# Patient Record
Sex: FEMALE | Race: WHITE | NOT HISPANIC OR LATINO | URBAN - METROPOLITAN AREA
[De-identification: names, ages, dates, MRNs, and addresses within clinical notes are randomized per-mention and may not be internally consistent; named-entity substitution may affect disease eponyms.]

---

## 2020-09-24 ENCOUNTER — OFFICE VISIT (OUTPATIENT)
Dept: URBAN - METROPOLITAN AREA CLINIC 23 | Facility: CLINIC | Age: 70
End: 2020-09-24
Payer: COMMERCIAL

## 2020-09-24 VITALS
BODY MASS INDEX: 35.3 KG/M2 | DIASTOLIC BLOOD PRESSURE: 70 MMHG | SYSTOLIC BLOOD PRESSURE: 133 MMHG | TEMPERATURE: 98 F | HEIGHT: 63 IN | HEART RATE: 79 BPM | WEIGHT: 199.2 LBS

## 2020-09-24 DIAGNOSIS — R14.3 GAS: ICD-10-CM

## 2020-09-24 DIAGNOSIS — R14.0 BLOATING: ICD-10-CM

## 2020-09-24 DIAGNOSIS — K59.01 CONSTIPATION: ICD-10-CM

## 2020-09-24 DIAGNOSIS — Z12.11 SCREEN FOR COLON CANCER: ICD-10-CM

## 2020-09-24 DIAGNOSIS — K58.9 IRRITABLE BOWEL SYNDROME: ICD-10-CM

## 2020-09-24 DIAGNOSIS — R19.7 DIARRHEA: ICD-10-CM

## 2020-09-24 PROCEDURE — 3017F COLORECTAL CA SCREEN DOC REV: CPT | Performed by: INTERNAL MEDICINE

## 2020-09-24 PROCEDURE — G9903 PT SCRN TBCO ID AS NON USER: HCPCS | Performed by: INTERNAL MEDICINE

## 2020-09-24 PROCEDURE — G8427 DOCREV CUR MEDS BY ELIG CLIN: HCPCS | Performed by: INTERNAL MEDICINE

## 2020-09-24 PROCEDURE — 91065 BREATH HYDROGEN/METHANE TEST: CPT | Performed by: INTERNAL MEDICINE

## 2020-09-24 PROCEDURE — 99203 OFFICE O/P NEW LOW 30 MIN: CPT | Performed by: INTERNAL MEDICINE

## 2020-09-24 NOTE — HPI-TODAY'S VISIT:
69 yo female with history of IBS and GERD -she moved [place] to be closer to her daugther -she states that for the past 7-10 years she has seen the GI for reflu -had endoscopy in 2013 and had esopahgitis.  is on omeprazole for this -colonoscopy at that time was normal , colonoscopy repeat in 2023 -states that initially her IBS was more constipation, was on mirlaax daily.  in 2016 she started alternating between diarrhea and constipation.  this has continued since that time -she feels that the sx have flared more since she has moved 5 months ago -she often gets frequent loose stools which keep her from going far from a bathroom -at her last gi appointment she was told to switch from miralax to metamucil.  when she took the metamucil, she feels that this doesn't help the frequent stool -usually the loose stools happen if she is nervous, stressed - her  has significant health issues -she is also being treated for anxiety and panic disorder and is on meds for depression- she started effexor just now , ativan is used as needed -states that the loose frequent stools lasts for 6 days  -she has hyoscyamine for bloating and gas, but it doesn't help the loose stool -she takes immodium to help with the loose stool, usually takes 2 days of immodium -no blood in the stool -she had a cologuard test with pcp this year told it was negative -she has a lot of bloating and gas -she has noticed the change in her bowel mvoements for years -she takes culturelle daily- us tstarted taking it consistantly  -has gained weight , she feels that the symptoms are worse with this -she had gotten the handout

## 2020-11-02 ENCOUNTER — OFFICE VISIT (OUTPATIENT)
Dept: URBAN - METROPOLITAN AREA CLINIC 23 | Facility: CLINIC | Age: 70
End: 2020-11-02
Payer: COMMERCIAL

## 2020-11-02 ENCOUNTER — WEB ENCOUNTER (OUTPATIENT)
Dept: URBAN - METROPOLITAN AREA CLINIC 23 | Facility: CLINIC | Age: 70
End: 2020-11-02

## 2020-11-02 VITALS
HEIGHT: 63 IN | TEMPERATURE: 98.1 F | DIASTOLIC BLOOD PRESSURE: 83 MMHG | SYSTOLIC BLOOD PRESSURE: 134 MMHG | BODY MASS INDEX: 35.44 KG/M2 | WEIGHT: 200 LBS | HEART RATE: 93 BPM

## 2020-11-02 DIAGNOSIS — R19.7 DIARRHEA: ICD-10-CM

## 2020-11-02 DIAGNOSIS — Z12.11 SCREEN FOR COLON CANCER: ICD-10-CM

## 2020-11-02 DIAGNOSIS — R14.0 BLOATING: ICD-10-CM

## 2020-11-02 DIAGNOSIS — K58.9 IRRITABLE BOWEL SYNDROME: ICD-10-CM

## 2020-11-02 DIAGNOSIS — K59.01 CONSTIPATION: ICD-10-CM

## 2020-11-02 DIAGNOSIS — R14.3 GAS: ICD-10-CM

## 2020-11-02 PROCEDURE — 99213 OFFICE O/P EST LOW 20 MIN: CPT | Performed by: INTERNAL MEDICINE

## 2020-11-02 PROCEDURE — 3017F COLORECTAL CA SCREEN DOC REV: CPT | Performed by: INTERNAL MEDICINE

## 2020-11-02 PROCEDURE — G8420 CALC BMI NORM PARAMETERS: HCPCS | Performed by: INTERNAL MEDICINE

## 2020-11-02 PROCEDURE — G9903 PT SCRN TBCO ID AS NON USER: HCPCS | Performed by: INTERNAL MEDICINE

## 2020-11-02 RX ORDER — ATORVASTATIN CALCIUM 20 MG/1
1 TABLET TABLET, FILM COATED ORAL ONCE A DAY
Status: ACTIVE | COMMUNITY

## 2020-11-02 RX ORDER — OMEPRAZOLE 40 MG/1
1 CAPSULE 30 MINUTES BEFORE MORNING MEAL CAPSULE, DELAYED RELEASE ORAL ONCE A DAY
Status: ACTIVE | COMMUNITY

## 2020-11-02 NOTE — HPI-TODAY'S VISIT:
- she was on abx for uri so didn't do the sibo test -she states that she started taking the metamucil daily, didn't help with the constipation -she states that she took miralax as well for the constipation she got while she was on the z pack, she states that she only had one bout of loose stool since she was seen -she feels that the diarrhea is better but the constipation  -she doesn't feel like she is emptying out well.  she sometimes has straining -she states that she is trying the fodmap diet  -she states that her last colonoscopy was done in 2013 because of bleeding, she was definitely having these symptoms at that time she states clearly.

## 2020-11-05 ENCOUNTER — OFFICE VISIT (OUTPATIENT)
Dept: URBAN - METROPOLITAN AREA CLINIC 23 | Facility: CLINIC | Age: 70
End: 2020-11-05

## 2020-11-20 ENCOUNTER — OFFICE VISIT (OUTPATIENT)
Dept: URBAN - METROPOLITAN AREA CLINIC 22 | Facility: CLINIC | Age: 70
End: 2020-11-20
Payer: COMMERCIAL

## 2020-11-20 DIAGNOSIS — R19.7 ACUTE DIARRHEA: ICD-10-CM

## 2020-11-20 PROCEDURE — 91065 BREATH HYDROGEN/METHANE TEST: CPT | Performed by: INTERNAL MEDICINE

## 2020-12-04 ENCOUNTER — TELEPHONE ENCOUNTER (OUTPATIENT)
Dept: URBAN - METROPOLITAN AREA CLINIC 23 | Facility: CLINIC | Age: 70
End: 2020-12-04

## 2020-12-04 RX ORDER — RIFAXIMIN 550 MG/1
1 TABLET TABLET ORAL THREE TIMES A DAY
Qty: 42 TABLET | Refills: 0 | OUTPATIENT
Start: 2020-12-07 | End: 2020-12-21

## 2020-12-10 ENCOUNTER — TELEPHONE ENCOUNTER (OUTPATIENT)
Dept: URBAN - METROPOLITAN AREA CLINIC 23 | Facility: CLINIC | Age: 70
End: 2020-12-10

## 2020-12-30 ENCOUNTER — OFFICE VISIT (OUTPATIENT)
Dept: URBAN - METROPOLITAN AREA TELEHEALTH 2 | Facility: TELEHEALTH | Age: 70
End: 2020-12-30
Payer: COMMERCIAL

## 2020-12-30 DIAGNOSIS — K58.0 IRRITABLE BOWEL SYNDROME WITH DIARRHEA: ICD-10-CM

## 2020-12-30 PROCEDURE — 97802 MEDICAL NUTRITION INDIV IN: CPT | Performed by: DIETITIAN, REGISTERED

## 2020-12-30 RX ORDER — ATORVASTATIN CALCIUM 20 MG/1
1 TABLET TABLET, FILM COATED ORAL ONCE A DAY
Status: ACTIVE | COMMUNITY

## 2020-12-30 RX ORDER — OMEPRAZOLE 40 MG/1
1 CAPSULE 30 MINUTES BEFORE MORNING MEAL CAPSULE, DELAYED RELEASE ORAL ONCE A DAY
Status: ACTIVE | COMMUNITY

## 2021-01-07 ENCOUNTER — OFFICE VISIT (OUTPATIENT)
Dept: URBAN - METROPOLITAN AREA CLINIC 23 | Facility: CLINIC | Age: 71
End: 2021-01-07
Payer: COMMERCIAL

## 2021-01-07 VITALS
WEIGHT: 197 LBS | HEART RATE: 96 BPM | HEIGHT: 63 IN | DIASTOLIC BLOOD PRESSURE: 85 MMHG | TEMPERATURE: 97.7 F | BODY MASS INDEX: 34.91 KG/M2 | SYSTOLIC BLOOD PRESSURE: 148 MMHG

## 2021-01-07 DIAGNOSIS — K21.9 GERD: ICD-10-CM

## 2021-01-07 DIAGNOSIS — Z12.11 SCREEN FOR COLON CANCER: ICD-10-CM

## 2021-01-07 DIAGNOSIS — R14.3 GAS: ICD-10-CM

## 2021-01-07 DIAGNOSIS — K58.2 IRRITABLE BOWEL SYNDROME WITH BOTH CONSTIPATION AND DIARRHEA: ICD-10-CM

## 2021-01-07 PROCEDURE — G9903 PT SCRN TBCO ID AS NON USER: HCPCS | Performed by: INTERNAL MEDICINE

## 2021-01-07 PROCEDURE — G8420 CALC BMI NORM PARAMETERS: HCPCS | Performed by: INTERNAL MEDICINE

## 2021-01-07 PROCEDURE — 99214 OFFICE O/P EST MOD 30 MIN: CPT | Performed by: INTERNAL MEDICINE

## 2021-01-07 PROCEDURE — G8430 EC AT DOC MEDREC PT NOT ELIG: HCPCS | Performed by: INTERNAL MEDICINE

## 2021-01-07 PROCEDURE — G8482 FLU IMMUNIZE ORDER/ADMIN: HCPCS | Performed by: INTERNAL MEDICINE

## 2021-01-07 PROCEDURE — 3017F COLORECTAL CA SCREEN DOC REV: CPT | Performed by: INTERNAL MEDICINE

## 2021-01-07 PROCEDURE — 1036F TOBACCO NON-USER: CPT | Performed by: INTERNAL MEDICINE

## 2021-01-07 RX ORDER — ATORVASTATIN CALCIUM 20 MG/1
1 TABLET TABLET, FILM COATED ORAL ONCE A DAY
Status: ACTIVE | COMMUNITY

## 2021-01-07 RX ORDER — OMEPRAZOLE 40 MG/1
1 CAPSULE 30 MINUTES BEFORE MORNING MEAL CAPSULE, DELAYED RELEASE ORAL ONCE A DAY
Status: ACTIVE | COMMUNITY

## 2021-01-07 RX ORDER — LINACLOTIDE 145 UG/1
1 CAPSULE AT LEAST 30 MINUTES BEFORE THE FIRST MEAL OF THE DAY ON AN EMPTY STOMACH CAPSULE, GELATIN COATED ORAL ONCE A DAY
Qty: 90 | Refills: 3 | OUTPATIENT
Start: 2021-01-07

## 2021-01-07 NOTE — HPI-TODAY'S VISIT:
- she was on abx for uri so didn't do the sibo test -she states that she started taking the metamucil daily, didn't help with the constipation -she states that she took miralax as well for the constipation she got while she was on the z pack, she states that she only had one bout of loose stool since she was seen -she feels that the diarrhea is better but the constipation  -she doesn't feel like she is emptying out well.  she sometimes has straining -she states that she is trying the fodmap diet  -she states that her last colonoscopy was done in 2013 because of bleeding, she was definitely having these symptoms at that time she states clearly. ------------------------------------------------------------------------------------------------------------------- 1/7/21 she states that she took breath test , was positive and took xifaxin 14 day course -still has symptoms. felt marginally better -she feels like she always has gas and bloating. she states that she wanted to know if she could use gas x and beano  -when she has constipation she doesn't feel that miralax does help.

## 2021-01-07 NOTE — HPI-OTHER HISTORIES
71 yo female with history of IBS and GERD -she [place] to be closer to her daugther -she states that for the past 7-10 years she has seen the GI for reflu -had endoscopy in 2013 and had esopahgitis.  is on omeprazole for this -colonoscopy at that time was normal , colonoscopy repeat in 2023 -states that initially her IBS was more constipation, was on mirlaax daily.  in 2016 she started alternating between diarrhea and constipation.  this has continued since that time -she feels that the sx have flared more since she has moved 5 months ago -she often gets frequent loose stools which keep her from going far from a bathroom -at her last gi appointment she was told to switch from miralax to metamucil.  when she took the metamucil, she feels that this doesn't help the frequent stool -usually the loose stools happen if she is nervous, stressed - her  has significant health issues -she is also being treated for anxiety and panic disorder and is on meds for depression- she started effexor just now , ativan is used as needed -states that the loose frequent stools lasts for 6 days  -she has hyoscyamine for bloating and gas, but it doesn't help the loose stool -she takes immodium to help with the loose stool, usually takes 2 days of immodium -no blood in the stool -she had a cologuard test with pcp this year told it was negative -she has a lot of bloating and gas -she has noticed the change in her bowel mvoements for years -she takes culturelle daily- us tstarted taking it consistantly  -has gained weight , she feels that the symptoms are worse with this -she had gotten the handout

## 2021-01-09 LAB
DEAMIDATED GLIADIN ABS, IGA: 3
DEAMIDATED GLIADIN ABS, IGG: 2
ENDOMYSIAL ANTIBODY IGA: NEGATIVE
IMMUNOGLOBULIN A, QN, SERUM: 226
T-TRANSGLUTAMINASE (TTG) IGA: <2
T-TRANSGLUTAMINASE (TTG) IGG: <2

## 2021-01-19 ENCOUNTER — TELEPHONE ENCOUNTER (OUTPATIENT)
Dept: URBAN - METROPOLITAN AREA CLINIC 92 | Facility: CLINIC | Age: 71
End: 2021-01-19

## 2021-02-03 ENCOUNTER — OFFICE VISIT (OUTPATIENT)
Dept: URBAN - METROPOLITAN AREA TELEHEALTH 2 | Facility: TELEHEALTH | Age: 71
End: 2021-02-03
Payer: COMMERCIAL

## 2021-02-03 DIAGNOSIS — K58.9 IBS (IRRITABLE BOWEL SYNDROME): ICD-10-CM

## 2021-02-03 PROCEDURE — 97803 MED NUTRITION INDIV SUBSEQ: CPT | Performed by: DIETITIAN, REGISTERED

## 2021-02-03 RX ORDER — OMEPRAZOLE 40 MG/1
1 CAPSULE 30 MINUTES BEFORE MORNING MEAL CAPSULE, DELAYED RELEASE ORAL ONCE A DAY
Status: ACTIVE | COMMUNITY

## 2021-02-03 RX ORDER — ATORVASTATIN CALCIUM 20 MG/1
1 TABLET TABLET, FILM COATED ORAL ONCE A DAY
Status: ACTIVE | COMMUNITY

## 2021-02-03 RX ORDER — LINACLOTIDE 145 UG/1
1 CAPSULE AT LEAST 30 MINUTES BEFORE THE FIRST MEAL OF THE DAY ON AN EMPTY STOMACH CAPSULE, GELATIN COATED ORAL ONCE A DAY
Qty: 90 | Refills: 3 | Status: ACTIVE | COMMUNITY
Start: 2021-01-07

## 2021-03-11 ENCOUNTER — OFFICE VISIT (OUTPATIENT)
Dept: URBAN - METROPOLITAN AREA SURGERY CENTER 15 | Facility: SURGERY CENTER | Age: 71
End: 2021-03-11
Payer: COMMERCIAL

## 2021-03-11 ENCOUNTER — CLAIMS CREATED FROM THE CLAIM WINDOW (OUTPATIENT)
Dept: URBAN - METROPOLITAN AREA CLINIC 4 | Facility: CLINIC | Age: 71
End: 2021-03-11
Payer: COMMERCIAL

## 2021-03-11 DIAGNOSIS — K63.89 BACTERIAL OVERGROWTH SYNDROME: ICD-10-CM

## 2021-03-11 DIAGNOSIS — D12.4 BENIGN NEOPLASM OF DESCENDING COLON: ICD-10-CM

## 2021-03-11 DIAGNOSIS — D12.4 ADENOMA OF DESCENDING COLON: ICD-10-CM

## 2021-03-11 DIAGNOSIS — K31.89 ACQUIRED DEFORMITY OF DUODENUM: ICD-10-CM

## 2021-03-11 DIAGNOSIS — K63.89 MASS OF HEPATIC FLEXURE OF COLON: ICD-10-CM

## 2021-03-11 DIAGNOSIS — K31.89 DILATION OF STOMACH: ICD-10-CM

## 2021-03-11 DIAGNOSIS — Z86.010 H/O ADENOMATOUS POLYP OF COLON: ICD-10-CM

## 2021-03-11 DIAGNOSIS — K31.9 DISEASE OF STOMACH AND DUODENUM, UNSPECIFIED: ICD-10-CM

## 2021-03-11 PROCEDURE — 88312 SPECIAL STAINS GROUP 1: CPT | Performed by: PATHOLOGY

## 2021-03-11 PROCEDURE — G8907 PT DOC NO EVENTS ON DISCHARG: HCPCS | Performed by: INTERNAL MEDICINE

## 2021-03-11 PROCEDURE — 88305 TISSUE EXAM BY PATHOLOGIST: CPT | Performed by: PATHOLOGY

## 2021-03-11 PROCEDURE — 43239 EGD BIOPSY SINGLE/MULTIPLE: CPT | Performed by: INTERNAL MEDICINE

## 2021-03-11 PROCEDURE — 45380 COLONOSCOPY AND BIOPSY: CPT | Performed by: INTERNAL MEDICINE

## 2021-03-11 PROCEDURE — 45385 COLONOSCOPY W/LESION REMOVAL: CPT | Performed by: INTERNAL MEDICINE

## 2021-03-11 RX ORDER — ATORVASTATIN CALCIUM 20 MG/1
1 TABLET TABLET, FILM COATED ORAL ONCE A DAY
Status: ACTIVE | COMMUNITY

## 2021-03-11 RX ORDER — LINACLOTIDE 145 UG/1
1 CAPSULE AT LEAST 30 MINUTES BEFORE THE FIRST MEAL OF THE DAY ON AN EMPTY STOMACH CAPSULE, GELATIN COATED ORAL ONCE A DAY
Qty: 90 | Refills: 3 | Status: ACTIVE | COMMUNITY
Start: 2021-01-07

## 2021-03-11 RX ORDER — OMEPRAZOLE 40 MG/1
1 CAPSULE 30 MINUTES BEFORE MORNING MEAL CAPSULE, DELAYED RELEASE ORAL ONCE A DAY
Status: ACTIVE | COMMUNITY

## 2021-03-26 ENCOUNTER — WEB ENCOUNTER (OUTPATIENT)
Dept: URBAN - METROPOLITAN AREA CLINIC 23 | Facility: CLINIC | Age: 71
End: 2021-03-26

## 2021-03-29 ENCOUNTER — WEB ENCOUNTER (OUTPATIENT)
Dept: URBAN - METROPOLITAN AREA CLINIC 23 | Facility: CLINIC | Age: 71
End: 2021-03-29

## 2021-03-30 ENCOUNTER — WEB ENCOUNTER (OUTPATIENT)
Dept: URBAN - METROPOLITAN AREA CLINIC 23 | Facility: CLINIC | Age: 71
End: 2021-03-30

## 2021-03-30 RX ORDER — LINACLOTIDE 145 UG/1
1 CAPSULE AT LEAST 30 MINUTES BEFORE THE FIRST MEAL OF THE DAY ON AN EMPTY STOMACH CAPSULE, GELATIN COATED ORAL ONCE A DAY
Qty: 90 | Refills: 3 | Status: ACTIVE | COMMUNITY
Start: 2021-01-07

## 2021-03-30 RX ORDER — OMEPRAZOLE 40 MG/1
1 CAPSULE 30 MINUTES BEFORE MORNING MEAL CAPSULE, DELAYED RELEASE ORAL ONCE A DAY
Status: ACTIVE | COMMUNITY

## 2021-03-30 RX ORDER — LINACLOTIDE 290 UG/1
1 CAPSULE AT LEAST 30 MINUTES BEFORE THE FIRST MEAL OF THE DAY ON AN EMPTY STOMACH CAPSULE, GELATIN COATED ORAL ONCE A DAY
Qty: 90 | Refills: 0 | OUTPATIENT
Start: 2021-03-31 | End: 2021-06-29

## 2021-03-30 RX ORDER — ATORVASTATIN CALCIUM 20 MG/1
1 TABLET TABLET, FILM COATED ORAL ONCE A DAY
Status: ACTIVE | COMMUNITY

## 2021-05-10 ENCOUNTER — OFFICE VISIT (OUTPATIENT)
Dept: URBAN - METROPOLITAN AREA CLINIC 23 | Facility: CLINIC | Age: 71
End: 2021-05-10
Payer: COMMERCIAL

## 2021-05-10 VITALS
WEIGHT: 198 LBS | TEMPERATURE: 97.5 F | DIASTOLIC BLOOD PRESSURE: 81 MMHG | BODY MASS INDEX: 35.08 KG/M2 | HEART RATE: 80 BPM | SYSTOLIC BLOOD PRESSURE: 150 MMHG | HEIGHT: 63 IN

## 2021-05-10 DIAGNOSIS — K21.9 GERD: ICD-10-CM

## 2021-05-10 DIAGNOSIS — K59.01 CONSTIPATION: ICD-10-CM

## 2021-05-10 DIAGNOSIS — R19.7 DIARRHEA: ICD-10-CM

## 2021-05-10 DIAGNOSIS — R14.3 GAS: ICD-10-CM

## 2021-05-10 DIAGNOSIS — K58.9 IRRITABLE BOWEL SYNDROME: ICD-10-CM

## 2021-05-10 DIAGNOSIS — R14.0 BLOATING: ICD-10-CM

## 2021-05-10 DIAGNOSIS — Z86.010 PERSONAL HISTORY OF COLONIC POLYPS: ICD-10-CM

## 2021-05-10 DIAGNOSIS — K63.89 SMALL INTESTINAL BACTERIAL OVERGROWTH (SIBO): ICD-10-CM

## 2021-05-10 PROBLEM — 275978004 SCREENING FOR MALIGNANT NEOPLASM OF COLON: Status: ACTIVE | Noted: 2020-09-24

## 2021-05-10 PROCEDURE — 99214 OFFICE O/P EST MOD 30 MIN: CPT | Performed by: INTERNAL MEDICINE

## 2021-05-10 RX ORDER — LINACLOTIDE 290 UG/1
1 CAPSULE AT LEAST 30 MINUTES BEFORE THE FIRST MEAL OF THE DAY ON AN EMPTY STOMACH CAPSULE, GELATIN COATED ORAL ONCE A DAY
Qty: 90 | Refills: 0 | Status: ACTIVE | COMMUNITY
Start: 2021-03-31 | End: 2021-06-29

## 2021-05-10 RX ORDER — OMEPRAZOLE 40 MG/1
1 CAPSULE 30 MINUTES BEFORE MORNING MEAL CAPSULE, DELAYED RELEASE ORAL ONCE A DAY
Status: ACTIVE | COMMUNITY

## 2021-05-10 RX ORDER — LINACLOTIDE 145 UG/1
1 CAPSULE AT LEAST 30 MINUTES BEFORE THE FIRST MEAL OF THE DAY ON AN EMPTY STOMACH CAPSULE, GELATIN COATED ORAL ONCE A DAY
Qty: 90 | Refills: 3 | Status: ACTIVE | COMMUNITY
Start: 2021-01-07

## 2021-05-10 RX ORDER — ATORVASTATIN CALCIUM 20 MG/1
1 TABLET TABLET, FILM COATED ORAL ONCE A DAY
Status: ACTIVE | COMMUNITY

## 2021-05-10 RX ORDER — OMEPRAZOLE 40 MG/1
1 CAPSULE 30 MINUTES BEFORE MORNING MEAL CAPSULE, DELAYED RELEASE ORAL ONCE A DAY
Qty: 90 | Refills: 1 | OUTPATIENT
Start: 2021-05-10

## 2021-05-10 RX ORDER — LINACLOTIDE 290 UG/1
1 CAPSULE AT LEAST 30 MINUTES BEFORE THE FIRST MEAL OF THE DAY ON AN EMPTY STOMACH CAPSULE, GELATIN COATED ORAL ONCE A DAY
Qty: 90 | Refills: 0 | OUTPATIENT
Start: 2021-05-10 | End: 2021-08-08

## 2021-05-10 NOTE — HPI-TODAY'S VISIT:
1/7/21 she states that she took breath test , was positive and took xifaxin 14 day course -still has symptoms. felt marginally better -she feels like she always has gas and bloating. she states that she wanted to know if she could use gas x and beano  -when she has constipation she doesn't feel that miralax does help. ====================================================================================== 5/10/2021 egd /colon 3/11- two small colonic polyps, egd normal.  ta on colonoscopy.  she has increase linzess to 290 mcg - she is taking this daily but then can have an intermittent

## 2021-08-23 ENCOUNTER — OFFICE VISIT (OUTPATIENT)
Dept: URBAN - METROPOLITAN AREA CLINIC 23 | Facility: CLINIC | Age: 71
End: 2021-08-23
Payer: COMMERCIAL

## 2021-08-23 ENCOUNTER — WEB ENCOUNTER (OUTPATIENT)
Dept: URBAN - METROPOLITAN AREA CLINIC 23 | Facility: CLINIC | Age: 71
End: 2021-08-23

## 2021-08-23 VITALS
TEMPERATURE: 98 F | HEART RATE: 85 BPM | SYSTOLIC BLOOD PRESSURE: 142 MMHG | BODY MASS INDEX: 33.84 KG/M2 | WEIGHT: 191 LBS | DIASTOLIC BLOOD PRESSURE: 79 MMHG | HEIGHT: 63 IN

## 2021-08-23 DIAGNOSIS — R10.9 ABDOMINAL CRAMPING: ICD-10-CM

## 2021-08-23 DIAGNOSIS — Z86.010 PERSONAL HISTORY OF COLONIC POLYPS: ICD-10-CM

## 2021-08-23 DIAGNOSIS — R19.7 DIARRHEA: ICD-10-CM

## 2021-08-23 DIAGNOSIS — K21.9 GERD: ICD-10-CM

## 2021-08-23 DIAGNOSIS — K59.01 CONSTIPATION: ICD-10-CM

## 2021-08-23 DIAGNOSIS — K58.9 IRRITABLE BOWEL SYNDROME: ICD-10-CM

## 2021-08-23 DIAGNOSIS — K63.89 SMALL INTESTINAL BACTERIAL OVERGROWTH (SIBO): ICD-10-CM

## 2021-08-23 DIAGNOSIS — R14.3 GAS: ICD-10-CM

## 2021-08-23 DIAGNOSIS — R14.0 BLOATING: ICD-10-CM

## 2021-08-23 PROCEDURE — 99214 OFFICE O/P EST MOD 30 MIN: CPT | Performed by: INTERNAL MEDICINE

## 2021-08-23 RX ORDER — OMEPRAZOLE 40 MG/1
1 CAPSULE 30 MINUTES BEFORE MORNING MEAL CAPSULE, DELAYED RELEASE ORAL ONCE A DAY
Status: ACTIVE | COMMUNITY

## 2021-08-23 RX ORDER — OMEPRAZOLE 40 MG/1
1 CAPSULE 30 MINUTES BEFORE MORNING MEAL CAPSULE, DELAYED RELEASE ORAL ONCE A DAY
Qty: 90 | Refills: 1 | Status: ACTIVE | COMMUNITY
Start: 2021-05-10

## 2021-08-23 RX ORDER — ATORVASTATIN CALCIUM 20 MG/1
1 TABLET TABLET, FILM COATED ORAL ONCE A DAY
Status: ACTIVE | COMMUNITY

## 2021-08-23 RX ORDER — LINACLOTIDE 145 UG/1
1 CAPSULE AT LEAST 30 MINUTES BEFORE THE FIRST MEAL OF THE DAY ON AN EMPTY STOMACH CAPSULE, GELATIN COATED ORAL ONCE A DAY
Qty: 90 | Refills: 3 | Status: ACTIVE | COMMUNITY
Start: 2021-01-07

## 2021-08-23 NOTE — HPI-TODAY'S VISIT:
1/7/21 she states that she took breath test , was positive and took xifaxin 14 day course -still has symptoms. felt marginally better -she feels like she always has gas and bloating. she states that she wanted to know if she could use gas x and beano  -when she has constipation she doesn't feel that miralax does help. ====================================================================================== 5/10/2021 egd /colon 3/11- two small colonic polyps, egd normal.  ta on colonoscopy.  she has increase linzess to 290 mcg - she is taking this daily but then can have an intermittent ================================================================================= 8/23/2021 she states that last wednesday went out to dinner and had fish and chips, food she generally avoids -she was burping up the food afterwards -around 4 hours later she started having nausea, no vomiting.  she had cramping which felt like she also had gas but couldn't pass it.  she could not sleep that night.  finally started passing gas in the morning and had a bowel movement.   -all of her recent labs with Dr. Del Angel on 5/11/21 were normal -she has been avoiding the greasy food altogether -she now feels that the linzess is not working -reports ongoing bupring with eating, she states that she is not always having a complete bowel movement- sometimes has cramping and small amount of food -if she goes 3 days without bm , takes a stool softener.   she was adding miralax on a daily basis which has helped  -she states that she is under a lot of stress- due to her 's handicap  and she is the primary handicap. she also does not like living here. -she has  alot of anxiety- she is decreasing the paxil and likely changing over to the wellbutrin

## 2021-08-25 ENCOUNTER — WEB ENCOUNTER (OUTPATIENT)
Dept: URBAN - METROPOLITAN AREA CLINIC 23 | Facility: CLINIC | Age: 71
End: 2021-08-25

## 2021-08-27 ENCOUNTER — WEB ENCOUNTER (OUTPATIENT)
Dept: URBAN - METROPOLITAN AREA CLINIC 23 | Facility: CLINIC | Age: 71
End: 2021-08-27

## 2021-08-27 ENCOUNTER — LAB OUTSIDE AN ENCOUNTER (OUTPATIENT)
Dept: URBAN - METROPOLITAN AREA CLINIC 23 | Facility: CLINIC | Age: 71
End: 2021-08-27

## 2021-08-27 ENCOUNTER — TELEPHONE ENCOUNTER (OUTPATIENT)
Dept: URBAN - METROPOLITAN AREA CLINIC 92 | Facility: CLINIC | Age: 71
End: 2021-08-27

## 2021-08-31 ENCOUNTER — WEB ENCOUNTER (OUTPATIENT)
Dept: URBAN - METROPOLITAN AREA CLINIC 23 | Facility: CLINIC | Age: 71
End: 2021-08-31

## 2021-09-14 ENCOUNTER — WEB ENCOUNTER (OUTPATIENT)
Dept: URBAN - METROPOLITAN AREA CLINIC 23 | Facility: CLINIC | Age: 71
End: 2021-09-14

## 2021-09-16 ENCOUNTER — OFFICE VISIT (OUTPATIENT)
Dept: URBAN - METROPOLITAN AREA CLINIC 23 | Facility: CLINIC | Age: 71
End: 2021-09-16

## 2021-09-19 ENCOUNTER — WEB ENCOUNTER (OUTPATIENT)
Dept: URBAN - METROPOLITAN AREA CLINIC 23 | Facility: CLINIC | Age: 71
End: 2021-09-19

## 2021-09-20 ENCOUNTER — WEB ENCOUNTER (OUTPATIENT)
Dept: URBAN - METROPOLITAN AREA CLINIC 23 | Facility: CLINIC | Age: 71
End: 2021-09-20

## 2021-09-28 ENCOUNTER — WEB ENCOUNTER (OUTPATIENT)
Dept: URBAN - METROPOLITAN AREA CLINIC 23 | Facility: CLINIC | Age: 71
End: 2021-09-28

## 2021-09-29 ENCOUNTER — WEB ENCOUNTER (OUTPATIENT)
Dept: URBAN - METROPOLITAN AREA CLINIC 23 | Facility: CLINIC | Age: 71
End: 2021-09-29

## 2021-11-08 ENCOUNTER — WEB ENCOUNTER (OUTPATIENT)
Dept: URBAN - METROPOLITAN AREA CLINIC 23 | Facility: CLINIC | Age: 71
End: 2021-11-08

## 2021-11-08 ENCOUNTER — OFFICE VISIT (OUTPATIENT)
Dept: URBAN - METROPOLITAN AREA CLINIC 23 | Facility: CLINIC | Age: 71
End: 2021-11-08

## 2021-11-15 ENCOUNTER — OFFICE VISIT (OUTPATIENT)
Dept: URBAN - METROPOLITAN AREA CLINIC 23 | Facility: CLINIC | Age: 71
End: 2021-11-15

## 2021-11-29 ENCOUNTER — OFFICE VISIT (OUTPATIENT)
Dept: URBAN - METROPOLITAN AREA CLINIC 23 | Facility: CLINIC | Age: 71
End: 2021-11-29
Payer: COMMERCIAL

## 2021-11-29 DIAGNOSIS — R10.9 ABDOMINAL CRAMPING: ICD-10-CM

## 2021-11-29 DIAGNOSIS — Z86.010 PERSONAL HISTORY OF COLONIC POLYPS: ICD-10-CM

## 2021-11-29 DIAGNOSIS — K21.9 GERD: ICD-10-CM

## 2021-11-29 DIAGNOSIS — K58.9 IRRITABLE BOWEL SYNDROME: ICD-10-CM

## 2021-11-29 DIAGNOSIS — R19.7 DIARRHEA: ICD-10-CM

## 2021-11-29 DIAGNOSIS — C56.9 MALIGNANT NEOPLASM OF OVARY, UNSPECIFIED LATERALITY: ICD-10-CM

## 2021-11-29 DIAGNOSIS — K59.01 CONSTIPATION: ICD-10-CM

## 2021-11-29 DIAGNOSIS — R14.0 BLOATING: ICD-10-CM

## 2021-11-29 DIAGNOSIS — K63.89 SMALL INTESTINAL BACTERIAL OVERGROWTH (SIBO): ICD-10-CM

## 2021-11-29 DIAGNOSIS — R14.3 GAS: ICD-10-CM

## 2021-11-29 PROCEDURE — 99214 OFFICE O/P EST MOD 30 MIN: CPT | Performed by: INTERNAL MEDICINE

## 2021-11-29 RX ORDER — ATORVASTATIN CALCIUM 20 MG/1
1 TABLET TABLET, FILM COATED ORAL ONCE A DAY
Status: ACTIVE | COMMUNITY

## 2021-11-29 RX ORDER — LINACLOTIDE 145 UG/1
1 CAPSULE AT LEAST 30 MINUTES BEFORE THE FIRST MEAL OF THE DAY ON AN EMPTY STOMACH CAPSULE, GELATIN COATED ORAL ONCE A DAY
Qty: 90 | Refills: 3 | Status: DISCONTINUED | COMMUNITY
Start: 2021-01-07

## 2021-11-29 RX ORDER — PANTOPRAZOLE SODIUM 20 MG/1
1 TABLET TABLET, DELAYED RELEASE ORAL TWICE DAILY
Qty: 60 | Refills: 1 | OUTPATIENT
Start: 2021-11-29

## 2021-11-29 RX ORDER — OMEPRAZOLE 40 MG/1
1 CAPSULE 30 MINUTES BEFORE MORNING MEAL CAPSULE, DELAYED RELEASE ORAL ONCE A DAY
Qty: 90 | Refills: 1 | Status: ACTIVE | COMMUNITY
Start: 2021-05-10

## 2021-11-29 RX ORDER — OMEPRAZOLE 40 MG/1
1 CAPSULE 30 MINUTES BEFORE MORNING MEAL CAPSULE, DELAYED RELEASE ORAL ONCE A DAY
Status: ACTIVE | COMMUNITY

## 2021-11-29 NOTE — HPI-TODAY'S VISIT:
1/7/21 she states that she took breath test , was positive and took xifaxin 14 day course -still has symptoms. felt marginally better -she feels like she always has gas and bloating. she states that she wanted to know if she could use gas x and beano  -when she has constipation she doesn't feel that miralax does help. ====================================================================================== 5/10/2021 egd /colon 3/11- two small colonic polyps, egd normal.  ta on colonoscopy.  she has increase linzess to 290 mcg - she is taking this daily but then can have an intermittent ================================================================================= 8/23/2021 she states that last wednesday went out to dinner and had fish and chips, food she generally avoids -she was burping up the food afterwards -around 4 hours later she started having nausea, no vomiting.  she had cramping which felt like she also had gas but couldn't pass it.  she could not sleep that night.  finally started passing gas in the morning and had a bowel movement.   -all of her recent labs with Dr. Del Angel on 5/11/21 were normal -she has been avoiding the greasy food altogether -she now feels that the linzess is not working -reports ongoing bupring with eating, she states that she is not always having a complete bowel movement- sometimes has cramping and small amount of food -if she goes 3 days without bm , takes a stool softener.   she was adding miralax on a daily basis which has helped  -she states that she is under a lot of stress- due to her 's handicap  and she is the primary handicap. she also does not like living here. -she has  alot of anxiety- she is decreasing the paxil and likely changing over to the wellbutrin ===================================================================================== 11/29/2021 she undergoing ongoing chemotherapy for the newly diagnosed ovarian cancer- she is seeing Dr. Raven South -she states that she is having ongoing GI issues -she was placed on suppository for constipation in the interim due to ongoing issues -she feels that both amitiza might be too much, but one might be too little -she also took a dulcolax  -then having more frequent stools -recently she took amitiza in the afternoon because she hand't had bm, then took a dulcolax. then had multiple bm -she is off of the narcotics-  she states that he reflux has gotten significantly worse- taking omeprazole at night. she feels that the severe burning was happening at night however she is also having it in the day -she has late night snacks and has to sleep sitting u -

## 2022-01-07 ENCOUNTER — WEB ENCOUNTER (OUTPATIENT)
Dept: URBAN - METROPOLITAN AREA CLINIC 23 | Facility: CLINIC | Age: 72
End: 2022-01-07

## 2022-01-07 RX ORDER — PANTOPRAZOLE SODIUM 20 MG/1
1 TABLET TABLET, DELAYED RELEASE ORAL TWICE DAILY
Qty: 180 | Refills: 1
Start: 2021-11-29

## 2022-01-13 ENCOUNTER — WEB ENCOUNTER (OUTPATIENT)
Dept: URBAN - METROPOLITAN AREA CLINIC 23 | Facility: CLINIC | Age: 72
End: 2022-01-13

## 2022-01-13 RX ORDER — PANTOPRAZOLE SODIUM 20 MG/1
1 TABLET TABLET, DELAYED RELEASE ORAL TWICE DAILY
Qty: 180 | Refills: 1
Start: 2021-11-29

## 2022-02-21 ENCOUNTER — OFFICE VISIT (OUTPATIENT)
Dept: URBAN - METROPOLITAN AREA CLINIC 23 | Facility: CLINIC | Age: 72
End: 2022-02-21
Payer: COMMERCIAL

## 2022-02-21 VITALS
TEMPERATURE: 98 F | DIASTOLIC BLOOD PRESSURE: 73 MMHG | HEART RATE: 96 BPM | HEIGHT: 63 IN | BODY MASS INDEX: 33.13 KG/M2 | SYSTOLIC BLOOD PRESSURE: 132 MMHG | WEIGHT: 187 LBS

## 2022-02-21 DIAGNOSIS — K21.9 GERD: ICD-10-CM

## 2022-02-21 DIAGNOSIS — K63.89 SMALL INTESTINAL BACTERIAL OVERGROWTH (SIBO): ICD-10-CM

## 2022-02-21 DIAGNOSIS — R14.0 BLOATING: ICD-10-CM

## 2022-02-21 DIAGNOSIS — R14.3 GAS: ICD-10-CM

## 2022-02-21 PROCEDURE — G8427 DOCREV CUR MEDS BY ELIG CLIN: HCPCS | Performed by: INTERNAL MEDICINE

## 2022-02-21 PROCEDURE — 1036F TOBACCO NON-USER: CPT | Performed by: INTERNAL MEDICINE

## 2022-02-21 PROCEDURE — 99213 OFFICE O/P EST LOW 20 MIN: CPT | Performed by: INTERNAL MEDICINE

## 2022-02-21 PROCEDURE — G9903 PT SCRN TBCO ID AS NON USER: HCPCS | Performed by: INTERNAL MEDICINE

## 2022-02-21 PROCEDURE — G8420 CALC BMI NORM PARAMETERS: HCPCS | Performed by: INTERNAL MEDICINE

## 2022-02-21 PROCEDURE — 3017F COLORECTAL CA SCREEN DOC REV: CPT | Performed by: INTERNAL MEDICINE

## 2022-02-21 PROCEDURE — G9622 NO UNHEAL ETOH USER: HCPCS | Performed by: INTERNAL MEDICINE

## 2022-02-21 RX ORDER — OMEPRAZOLE 40 MG/1
1 CAPSULE 30 MINUTES BEFORE MORNING MEAL CAPSULE, DELAYED RELEASE ORAL ONCE A DAY
Status: ACTIVE | COMMUNITY

## 2022-02-21 RX ORDER — BUPROPION HYDROCHLORIDE 150 MG/1
1 TABLET IN THE MORNING TABLET, FILM COATED ORAL ONCE A DAY
Status: ACTIVE | COMMUNITY

## 2022-02-21 RX ORDER — PANTOPRAZOLE SODIUM 20 MG/1
1 TABLET TABLET, DELAYED RELEASE ORAL TWICE DAILY
Qty: 180 | Refills: 1 | Status: ACTIVE | COMMUNITY
Start: 2021-11-29

## 2022-02-21 RX ORDER — ATORVASTATIN CALCIUM 20 MG/1
1 TABLET TABLET, FILM COATED ORAL ONCE A DAY
Status: ACTIVE | COMMUNITY

## 2022-02-21 RX ORDER — OMEPRAZOLE 40 MG/1
1 CAPSULE 30 MINUTES BEFORE MORNING MEAL CAPSULE, DELAYED RELEASE ORAL ONCE A DAY
Qty: 90 | Refills: 1 | Status: DISCONTINUED | COMMUNITY
Start: 2021-05-10

## 2022-02-21 RX ORDER — PANTOPRAZOLE SODIUM 20 MG/1
1 TABLET TABLET, DELAYED RELEASE ORAL TWICE DAILY
Qty: 60 | Refills: 1 | OUTPATIENT

## 2022-02-21 NOTE — HPI-TODAY'S VISIT:
2/21/2022 had her hystrectomy on 1/4- told by Dr. South , told that she has had great results with the chemo and no cancer/minimal cancer left.  she  was also found to have a large fibroid pushing into the colon. told it was straightened out since the surgery she feels there is some change -she still feels like she needs some help.  *amitiza- if she takes this at night she is having small frequent bm, has to go every hour , but doesn't feel like she is emptying out *colace if she tries this she feels it works too well *one capful of miralax is too much  -also was having a lot of gas and flatulance after the surgery -gerd-finally under control with pantoprazole twice daily

## 2022-02-21 NOTE — PREVIOUS WORKUP REVIEWED
.  ENDOSCOPIES egd /colon 3/11- two small colonic polyps, egd normal.  ta on colonoscopy.   LABS   IMAGES 8/2021- ultrasound fatty liver

## 2022-04-06 ENCOUNTER — ERX REFILL RESPONSE (OUTPATIENT)
Dept: URBAN - METROPOLITAN AREA CLINIC 23 | Facility: CLINIC | Age: 72
End: 2022-04-06

## 2022-04-06 RX ORDER — PANTOPRAZOLE SODIUM 20 MG/1
1 TABLET TABLET, DELAYED RELEASE ORAL TWICE DAILY
Qty: 60 | Refills: 1 | OUTPATIENT

## 2022-04-06 RX ORDER — PANTOPRAZOLE SODIUM 20 MG/1
1 TABLET ORALLY TWICE DAILY TABLET, DELAYED RELEASE ORAL
Qty: 60 TABLET | Refills: 2 | OUTPATIENT

## 2022-05-19 ENCOUNTER — OFFICE VISIT (OUTPATIENT)
Dept: URBAN - METROPOLITAN AREA CLINIC 23 | Facility: CLINIC | Age: 72
End: 2022-05-19
Payer: COMMERCIAL

## 2022-05-19 VITALS — TEMPERATURE: 97.9 F | WEIGHT: 185.8 LBS | HEIGHT: 63 IN | BODY MASS INDEX: 32.92 KG/M2

## 2022-05-19 DIAGNOSIS — R14.0 BLOATING: ICD-10-CM

## 2022-05-19 DIAGNOSIS — K63.89 SMALL INTESTINAL BACTERIAL OVERGROWTH (SIBO): ICD-10-CM

## 2022-05-19 DIAGNOSIS — K58.2 IRRITABLE BOWEL SYNDROME WITH CONSTIPATION AND DIARRHEA: ICD-10-CM

## 2022-05-19 DIAGNOSIS — C56.9 MALIGNANT NEOPLASM OF OVARY, UNSPECIFIED LATERALITY: ICD-10-CM

## 2022-05-19 DIAGNOSIS — K21.9 GERD: ICD-10-CM

## 2022-05-19 DIAGNOSIS — R10.84 ABDOMINAL PAIN, GENERALIZED: ICD-10-CM

## 2022-05-19 DIAGNOSIS — Z86.010 PERSONAL HISTORY OF COLONIC POLYPS: ICD-10-CM

## 2022-05-19 DIAGNOSIS — R14.3 GAS: ICD-10-CM

## 2022-05-19 PROCEDURE — 3017F COLORECTAL CA SCREEN DOC REV: CPT | Performed by: INTERNAL MEDICINE

## 2022-05-19 PROCEDURE — G9622 NO UNHEAL ETOH USER: HCPCS | Performed by: INTERNAL MEDICINE

## 2022-05-19 PROCEDURE — G8427 DOCREV CUR MEDS BY ELIG CLIN: HCPCS | Performed by: INTERNAL MEDICINE

## 2022-05-19 PROCEDURE — 1036F TOBACCO NON-USER: CPT | Performed by: INTERNAL MEDICINE

## 2022-05-19 PROCEDURE — G8420 CALC BMI NORM PARAMETERS: HCPCS | Performed by: INTERNAL MEDICINE

## 2022-05-19 PROCEDURE — 99214 OFFICE O/P EST MOD 30 MIN: CPT | Performed by: INTERNAL MEDICINE

## 2022-05-19 RX ORDER — OMEPRAZOLE 40 MG/1
1 CAPSULE 30 MINUTES BEFORE MORNING MEAL CAPSULE, DELAYED RELEASE ORAL ONCE A DAY
Status: ACTIVE | COMMUNITY

## 2022-05-19 RX ORDER — PANTOPRAZOLE SODIUM 20 MG/1
1 TABLET TABLET, DELAYED RELEASE ORAL TWICE DAILY
Qty: 60 | Refills: 1 | OUTPATIENT

## 2022-05-19 RX ORDER — LINACLOTIDE 290 UG/1
1 CAPSULE AT LEAST 30 MINUTES BEFORE THE FIRST MEAL OF THE DAY ON AN EMPTY STOMACH CAPSULE, GELATIN COATED ORAL ONCE A DAY
Qty: 90 | Refills: 1 | OUTPATIENT
Start: 2022-05-19 | End: 2022-11-14

## 2022-05-19 RX ORDER — PANTOPRAZOLE SODIUM 20 MG/1
1 TABLET ORALLY TWICE DAILY TABLET, DELAYED RELEASE ORAL
Qty: 60 TABLET | Refills: 2 | Status: ACTIVE | COMMUNITY

## 2022-05-19 RX ORDER — BUPROPION HYDROCHLORIDE 150 MG/1
1 TABLET IN THE MORNING TABLET, FILM COATED ORAL ONCE A DAY
Status: ACTIVE | COMMUNITY

## 2022-05-19 RX ORDER — ATORVASTATIN CALCIUM 20 MG/1
1 TABLET TABLET, FILM COATED ORAL ONCE A DAY
Status: ACTIVE | COMMUNITY

## 2022-05-19 NOTE — PHYSICAL EXAM CHEST:
chest wall non-tender, breathing is unlabored without accessory muscle use, normal breath sounds , chest wall non-tender,breathing is unlabored without accessory muscle use,normal breath sounds

## 2022-05-19 NOTE — PREVIOUS WORKUP REVIEWED
.  ENDOSCOPIES colonoscopy- 3/2021- 1/3 ta, hyperplastic egd- biopsies negative for h pylori 3/2021  LABS   IMAGES

## 2022-05-19 NOTE — HPI-TODAY'S VISIT:
2/21/2022 had her hystrectomy on 1/4- told by Dr. South , told that she has had great results with the chemo and no cancer/minimal cancer left.  she  was also found to have a large fibroid pushing into the colon. told it was straightened out since the surgery she feels there is some change -she still feels like she needs some help.  *amitiza- if she takes this at night she is having small frequent bm, has to go every hour , but doesn't feel like she is emptying out *colace if she tries this she feels it works too well *one capful of miralax is too much  -also was having a lot of gas and flatulance after the surgery -gerd-finally under control with pantoprazole twice daily ========================================================================= 5/19/2022 she finished her chemo on March 9 -once she finished her stools became regular so she didn't take linzess -now she is on a Zejula which is causing her constipation- started in early may -now she is not going for a few days at a time -2 colace doesn't work if she takes colace with laxative some improvement -she took her linzess today- 290 mcg -feels that her reflux has been well controlled at this time

## 2022-05-19 NOTE — PHYSICAL EXAM CARDIOVASCULAR:
no edema, no murmurs, regular rate and rhythm , S1, S2 normal, no murmurs, regular rate and rhythm,no peripheral edema

## 2022-05-19 NOTE — PHYSICAL EXAM GASTROINTESTINAL
Abdomen , soft, nontender, nondistended , no guarding or rigidity , no masses palpable , normal bowel sounds , Liver and Spleen,  no hepatosplenomegaly , liver nontender , Abdomen- soft, nontender, nondistended , no guarding or rigidity , no masses palpable , normal bowel sounds

## 2022-05-23 ENCOUNTER — OFFICE VISIT (OUTPATIENT)
Dept: URBAN - METROPOLITAN AREA CLINIC 23 | Facility: CLINIC | Age: 72
End: 2022-05-23

## 2022-05-24 ENCOUNTER — WEB ENCOUNTER (OUTPATIENT)
Dept: URBAN - METROPOLITAN AREA CLINIC 23 | Facility: CLINIC | Age: 72
End: 2022-05-24

## 2022-05-24 RX ORDER — PANTOPRAZOLE SODIUM 20 MG/1
1 TABLET TABLET, DELAYED RELEASE ORAL TWICE DAILY
Qty: 180 | Refills: 1

## 2022-08-18 ENCOUNTER — OFFICE VISIT (OUTPATIENT)
Dept: URBAN - METROPOLITAN AREA CLINIC 23 | Facility: CLINIC | Age: 72
End: 2022-08-18

## 2022-08-18 ENCOUNTER — OFFICE VISIT (OUTPATIENT)
Dept: URBAN - METROPOLITAN AREA CLINIC 23 | Facility: CLINIC | Age: 72
End: 2022-08-18
Payer: COMMERCIAL

## 2022-08-18 VITALS
HEIGHT: 63 IN | DIASTOLIC BLOOD PRESSURE: 82 MMHG | WEIGHT: 182 LBS | BODY MASS INDEX: 32.25 KG/M2 | SYSTOLIC BLOOD PRESSURE: 143 MMHG | TEMPERATURE: 97 F | HEART RATE: 97 BPM

## 2022-08-18 DIAGNOSIS — K63.89 SMALL INTESTINAL BACTERIAL OVERGROWTH (SIBO): ICD-10-CM

## 2022-08-18 DIAGNOSIS — Z86.010 PERSONAL HISTORY OF COLONIC POLYPS: ICD-10-CM

## 2022-08-18 DIAGNOSIS — K59.01 CONSTIPATION: ICD-10-CM

## 2022-08-18 DIAGNOSIS — C56.9 MALIGNANT NEOPLASM OF OVARY, UNSPECIFIED LATERALITY: ICD-10-CM

## 2022-08-18 DIAGNOSIS — R10.9 ABDOMINAL CRAMPING: ICD-10-CM

## 2022-08-18 DIAGNOSIS — R19.7 DIARRHEA: ICD-10-CM

## 2022-08-18 DIAGNOSIS — K58.9 IRRITABLE BOWEL SYNDROME: ICD-10-CM

## 2022-08-18 DIAGNOSIS — R14.0 BLOATING: ICD-10-CM

## 2022-08-18 DIAGNOSIS — R14.3 GAS: ICD-10-CM

## 2022-08-18 DIAGNOSIS — K21.9 GERD: ICD-10-CM

## 2022-08-18 PROCEDURE — 99213 OFFICE O/P EST LOW 20 MIN: CPT | Performed by: INTERNAL MEDICINE

## 2022-08-18 RX ORDER — PANTOPRAZOLE SODIUM 20 MG/1
1 TABLET TABLET, DELAYED RELEASE ORAL TWICE DAILY
Qty: 60 | Refills: 1 | OUTPATIENT

## 2022-08-18 RX ORDER — LINACLOTIDE 290 UG/1
1 CAPSULE AT LEAST 30 MINUTES BEFORE THE FIRST MEAL OF THE DAY ON AN EMPTY STOMACH CAPSULE, GELATIN COATED ORAL ONCE A DAY
Qty: 90 | Refills: 1 | OUTPATIENT

## 2022-08-18 RX ORDER — ATORVASTATIN CALCIUM 20 MG/1
1 TABLET TABLET, FILM COATED ORAL ONCE A DAY
Status: ACTIVE | COMMUNITY

## 2022-08-18 RX ORDER — BUPROPION HYDROCHLORIDE 150 MG/1
1 TABLET IN THE MORNING TABLET, FILM COATED ORAL ONCE A DAY
Status: ACTIVE | COMMUNITY

## 2022-08-18 RX ORDER — LINACLOTIDE 290 UG/1
1 CAPSULE AT LEAST 30 MINUTES BEFORE THE FIRST MEAL OF THE DAY ON AN EMPTY STOMACH CAPSULE, GELATIN COATED ORAL ONCE A DAY
Qty: 90 | Refills: 1 | Status: ACTIVE | COMMUNITY
Start: 2022-05-19 | End: 2022-11-14

## 2022-08-18 RX ORDER — NIRAPARIB 100 MG/1
2 CAPSULES CAPSULE ORAL ONCE A DAY
Status: ACTIVE | COMMUNITY

## 2022-08-18 RX ORDER — PANTOPRAZOLE SODIUM 20 MG/1
1 TABLET ORALLY TWICE DAILY TABLET, DELAYED RELEASE ORAL
Qty: 60 TABLET | Refills: 2 | Status: ACTIVE | COMMUNITY

## 2022-08-18 RX ORDER — PANTOPRAZOLE SODIUM 20 MG/1
1 TABLET TABLET, DELAYED RELEASE ORAL TWICE DAILY
Qty: 180 | Refills: 1 | Status: ACTIVE | COMMUNITY

## 2022-08-18 NOTE — HPI-TODAY'S VISIT:
2/21/2022 had her hystrectomy on 1/4- told by Dr. South , told that she has had great results with the chemo and no cancer/minimal cancer left.  she  was also found to have a large fibroid pushing into the colon. told it was straightened out since the surgery she feels there is some change -she still feels like she needs some help.  *amitiza- if she takes this at night she is having small frequent bm, has to go every hour , but doesn't feel like she is emptying out *colace if she tries this she feels it works too well *one capful of miralax is too much  -also was having a lot of gas and flatulance after the surgery -gerd-finally under control with pantoprazole twice daily ========================================================================= 5/19/2022 she finished her chemo on March 9 -once she finished her stools became regular so she didn't take linzess -now she is on a Zejula which is causing her constipation- started in early may -now she is not going for a few days at a time -2 colace doesn't work if she takes colace with laxative some improvement -she took her linzess today- 290 mcg -feels that her reflux has been well controlled at this time ===================================================================== 8/18/2022  her zejula has been titrated due to low blood count -she has a new nodule on a ct scan that wasn't previously there and getting biopsy -she had bad cramping with linzess and stopped -she worked on a regimen - if she hasn't gone for 2 days she takes two generic colace, will see a bm the net day, if no effect takes two more and then has a better emptying -miralax alone doesn't work -amitiza- more cramping -she is taking her pantoprazole with control of the sx

## 2022-10-24 ENCOUNTER — TELEPHONE ENCOUNTER (OUTPATIENT)
Dept: URBAN - METROPOLITAN AREA CLINIC 23 | Facility: CLINIC | Age: 72
End: 2022-10-24

## 2022-10-24 RX ORDER — PANTOPRAZOLE SODIUM 20 MG/1
1 TABLET TABLET, DELAYED RELEASE ORAL TWICE DAILY
Qty: 180 | Refills: 0

## 2022-11-14 ENCOUNTER — OFFICE VISIT (OUTPATIENT)
Dept: URBAN - METROPOLITAN AREA CLINIC 23 | Facility: CLINIC | Age: 72
End: 2022-11-14
Payer: COMMERCIAL

## 2022-11-14 DIAGNOSIS — Z86.010 PERSONAL HISTORY OF COLONIC POLYPS: ICD-10-CM

## 2022-11-14 DIAGNOSIS — K59.01 CONSTIPATION: ICD-10-CM

## 2022-11-14 DIAGNOSIS — R10.9 ABDOMINAL CRAMPING: ICD-10-CM

## 2022-11-14 DIAGNOSIS — R14.0 BLOATING: ICD-10-CM

## 2022-11-14 DIAGNOSIS — R14.3 GAS: ICD-10-CM

## 2022-11-14 DIAGNOSIS — K58.9 IRRITABLE BOWEL SYNDROME: ICD-10-CM

## 2022-11-14 DIAGNOSIS — K63.89 SMALL INTESTINAL BACTERIAL OVERGROWTH (SIBO): ICD-10-CM

## 2022-11-14 DIAGNOSIS — C56.9 MALIGNANT NEOPLASM OF OVARY, UNSPECIFIED LATERALITY: ICD-10-CM

## 2022-11-14 DIAGNOSIS — K21.9 GERD: ICD-10-CM

## 2022-11-14 DIAGNOSIS — R19.7 DIARRHEA: ICD-10-CM

## 2022-11-14 PROBLEM — 446081009: Status: ACTIVE | Noted: 2021-05-10

## 2022-11-14 PROBLEM — 428283002 HISTORY OF POLYP OF COLON (SITUATION): Status: ACTIVE | Noted: 2021-05-10

## 2022-11-14 PROBLEM — 363443007: Status: ACTIVE | Noted: 2021-11-29

## 2022-11-14 PROBLEM — 247330004: Status: ACTIVE | Noted: 2021-08-23

## 2022-11-14 PROBLEM — 14760008 CONSTIPATION: Status: ACTIVE | Noted: 2020-09-24

## 2022-11-14 PROBLEM — 62315008 DIARRHEA: Status: ACTIVE | Noted: 2020-09-24

## 2022-11-14 PROBLEM — 763824009 GAS: Status: ACTIVE | Noted: 2020-09-24

## 2022-11-14 PROBLEM — 10743008 IRRITABLE BOWEL SYNDROME: Status: ACTIVE | Noted: 2020-09-24

## 2022-11-14 PROBLEM — 235595009 GASTROESOPHAGEAL REFLUX DISEASE: Status: ACTIVE | Noted: 2021-01-07

## 2022-11-14 PROCEDURE — 99213 OFFICE O/P EST LOW 20 MIN: CPT | Performed by: INTERNAL MEDICINE

## 2022-11-14 RX ORDER — ATORVASTATIN CALCIUM 20 MG/1
1 TABLET TABLET, FILM COATED ORAL ONCE A DAY
Status: ACTIVE | COMMUNITY

## 2022-11-14 RX ORDER — PANTOPRAZOLE SODIUM 20 MG/1
1 TABLET ORALLY TWICE DAILY TABLET, DELAYED RELEASE ORAL
Qty: 60 TABLET | Refills: 2 | Status: ACTIVE | COMMUNITY

## 2022-11-14 RX ORDER — BUPROPION HYDROCHLORIDE 150 MG/1
1 TABLET IN THE MORNING TABLET, FILM COATED ORAL ONCE A DAY
Status: ACTIVE | COMMUNITY

## 2022-11-14 RX ORDER — LINACLOTIDE 290 UG/1
1 CAPSULE AT LEAST 30 MINUTES BEFORE THE FIRST MEAL OF THE DAY ON AN EMPTY STOMACH CAPSULE, GELATIN COATED ORAL ONCE A DAY
Qty: 90 | Refills: 1 | Status: ACTIVE | COMMUNITY

## 2022-11-14 RX ORDER — PANTOPRAZOLE SODIUM 20 MG/1
1 TABLET TABLET, DELAYED RELEASE ORAL TWICE DAILY
Qty: 180 | Refills: 0 | Status: ACTIVE | COMMUNITY

## 2022-11-14 RX ORDER — NEBIVOLOL 5 MG/1
1 TABLET TABLET ORAL ONCE A DAY
Status: ACTIVE | COMMUNITY

## 2022-11-14 RX ORDER — NIRAPARIB 100 MG/1
2 CAPSULES CAPSULE ORAL ONCE A DAY
Status: ACTIVE | COMMUNITY

## 2022-11-14 NOTE — HPI-TODAY'S VISIT:
2/21/2022 had her hystrectomy on 1/4- told by Dr. South , told that she has had great results with the chemo and no cancer/minimal cancer left.  she  was also found to have a large fibroid pushing into the colon. told it was straightened out since the surgery she feels there is some change -she still feels like she needs some help.  *amitiza- if she takes this at night she is having small frequent bm, has to go every hour , but doesn't feel like she is emptying out *colace if she tries this she feels it works too well *one capful of miralax is too much  -also was having a lot of gas and flatulance after the surgery -gerd-finally under control with pantoprazole twice daily ========================================================================= 5/19/2022 she finished her chemo on March 9 -once she finished her stools became regular so she didn't take linzess -now she is on a Zejula which is causing her constipation- started in early may -now she is not going for a few days at a time -2 colace doesn't work if she takes colace with laxative some improvement -she took her linzess today- 290 mcg -feels that her reflux has been well controlled at this time ===================================================================== 8/18/2022  her zejula has been titrated due to low blood count -she has a new nodule on a ct scan that wasn't previously there and getting biopsy -she had bad cramping with linzess and stopped -she worked on a regimen - if she hasn't gone for 2 days she takes two generic colace, will see a bm the net day, if no effect takes two more and then has a better emptying -miralax alone doesn't work -amitiza- more cramping -she is taking her pantoprazole with control of the sx ======================================================================= 11/14/2022 she is now on avastin- states that her bp has gone up wth this. on new bp meds -for the constipation she is n two stool softeners at night and miralax in the morning -goes between 1-3 bm a day -her fluid intale has been doing ok on a daily basis, she is getting  -no pain , weight, appetite

## 2022-12-31 ENCOUNTER — ERX REFILL RESPONSE (OUTPATIENT)
Dept: URBAN - METROPOLITAN AREA CLINIC 23 | Facility: CLINIC | Age: 72
End: 2022-12-31

## 2022-12-31 RX ORDER — PANTOPRAZOLE SODIUM 20 MG/1
TAKE 1 TABLET BY MOUTH  TWICE DAILY TABLET, DELAYED RELEASE ORAL
Qty: 180 TABLET | Refills: 1 | OUTPATIENT

## 2022-12-31 RX ORDER — PANTOPRAZOLE SODIUM 20 MG/1
1 TABLET TABLET, DELAYED RELEASE ORAL TWICE DAILY
Qty: 180 | Refills: 0 | OUTPATIENT

## 2023-05-15 ENCOUNTER — OFFICE VISIT (OUTPATIENT)
Dept: URBAN - METROPOLITAN AREA CLINIC 23 | Facility: CLINIC | Age: 73
End: 2023-05-15
Payer: COMMERCIAL

## 2023-05-15 VITALS
HEIGHT: 63 IN | HEART RATE: 66 BPM | TEMPERATURE: 97.9 F | DIASTOLIC BLOOD PRESSURE: 79 MMHG | WEIGHT: 190.2 LBS | SYSTOLIC BLOOD PRESSURE: 160 MMHG | BODY MASS INDEX: 33.7 KG/M2

## 2023-05-15 DIAGNOSIS — C56.9 MALIGNANT NEOPLASM OF OVARY, UNSPECIFIED LATERALITY: ICD-10-CM

## 2023-05-15 DIAGNOSIS — R14.3 GAS: ICD-10-CM

## 2023-05-15 DIAGNOSIS — K58.9 IRRITABLE BOWEL SYNDROME: ICD-10-CM

## 2023-05-15 DIAGNOSIS — R19.7 DIARRHEA: ICD-10-CM

## 2023-05-15 DIAGNOSIS — R10.9 ABDOMINAL CRAMPING: ICD-10-CM

## 2023-05-15 DIAGNOSIS — R14.0 BLOATING: ICD-10-CM

## 2023-05-15 DIAGNOSIS — Z86.010 PERSONAL HISTORY OF COLONIC POLYPS: ICD-10-CM

## 2023-05-15 DIAGNOSIS — K63.89 SMALL INTESTINAL BACTERIAL OVERGROWTH (SIBO): ICD-10-CM

## 2023-05-15 DIAGNOSIS — K21.9 GERD: ICD-10-CM

## 2023-05-15 DIAGNOSIS — K59.01 CONSTIPATION: ICD-10-CM

## 2023-05-15 PROCEDURE — 99214 OFFICE O/P EST MOD 30 MIN: CPT | Performed by: INTERNAL MEDICINE

## 2023-05-15 RX ORDER — NEBIVOLOL 5 MG/1
1 TABLET TABLET ORAL ONCE A DAY
Status: ACTIVE | COMMUNITY

## 2023-05-15 RX ORDER — ATORVASTATIN CALCIUM 20 MG/1
1 TABLET TABLET, FILM COATED ORAL ONCE A DAY
Status: ACTIVE | COMMUNITY

## 2023-05-15 RX ORDER — PANTOPRAZOLE SODIUM 20 MG/1
TAKE 1 TABLET BY MOUTH  TWICE DAILY TABLET, DELAYED RELEASE ORAL
Qty: 180 TABLET | Refills: 1 | Status: ACTIVE | COMMUNITY

## 2023-05-15 RX ORDER — BUPROPION HYDROCHLORIDE 150 MG/1
1 TABLET IN THE MORNING TABLET, FILM COATED ORAL ONCE A DAY
Status: ACTIVE | COMMUNITY

## 2023-05-15 RX ORDER — LINACLOTIDE 290 UG/1
1 CAPSULE AT LEAST 30 MINUTES BEFORE THE FIRST MEAL OF THE DAY ON AN EMPTY STOMACH CAPSULE, GELATIN COATED ORAL ONCE A DAY
Qty: 90 | Refills: 1 | Status: ACTIVE | COMMUNITY

## 2023-05-15 RX ORDER — NIRAPARIB 100 MG/1
2 CAPSULES CAPSULE ORAL ONCE A DAY
Status: ACTIVE | COMMUNITY

## 2023-05-15 NOTE — PREVIOUS WORKUP REVIEWED
.  ENDOSCOPIES colonoscopy- 3/2021- 1/3 ta, hyperplastic egd- biopsies negative for h pylori 3/2021  LABS   IMAGES ct 3/2023- no obstruction, peritoneal mets , left anterior cardiophrenic and right pericardial nodes, mildl enlarged portocaval node

## 2023-05-15 NOTE — HPI-TODAY'S VISIT:
11/14/2022 she is now on avastin- states that her bp has gone up wth this. on new bp meds -for the constipation she is n two stool softeners at night and miralax in the morning -goes between 1-3 bm a day -her fluid intale has been doing ok on a daily basis, she is getting  -no pain , weight, appetite ========================================================================== 5/15/2023 -reports that she feels her abdomen is distended like a bowling ball -reports that she had a good routine with her bm about 3 weeks ago went to a  baseball game, had a hot dog and fries and then got diarrhea stopped her miralax/stool softener/dulcolax then started having soft stool nthen no stool -generally taking miralax in the morning, then if she hasn't emptied takes docusate only. if still no effect takes docustate/senna -did have some bm on friday, felt that she was straining -had an enema on sunday with mineral oil . dulcolax and now had emptying out.  -she has tried fiber before which gave her gas -she is currently on avastin and zejula -also on bp meds and seeing nephrologist for protein in urine- taking clonidine --she reports that she is having some abdominal pain for 2 days -she was having bloating for the past three days until she emptied out yesterday -she has been having the feeling of distended abdomen for months- showed Dr. South in the fall -

## 2023-05-19 ENCOUNTER — ERX REFILL RESPONSE (OUTPATIENT)
Dept: URBAN - METROPOLITAN AREA CLINIC 23 | Facility: CLINIC | Age: 73
End: 2023-05-19

## 2023-05-19 RX ORDER — PANTOPRAZOLE SODIUM 20 MG/1
TAKE 1 TABLET BY MOUTH  TWICE DAILY TABLET, DELAYED RELEASE ORAL
Qty: 180 TABLET | Refills: 1 | OUTPATIENT

## 2023-05-19 RX ORDER — PANTOPRAZOLE SODIUM 20 MG/1
TAKE 1 TABLET BY MOUTH TWICE  DAILY TABLET, DELAYED RELEASE ORAL
Qty: 180 TABLET | Refills: 3 | OUTPATIENT

## 2023-07-10 ENCOUNTER — OFFICE VISIT (OUTPATIENT)
Dept: URBAN - METROPOLITAN AREA CLINIC 23 | Facility: CLINIC | Age: 73
End: 2023-07-10
Payer: COMMERCIAL

## 2023-07-10 VITALS
DIASTOLIC BLOOD PRESSURE: 71 MMHG | HEART RATE: 70 BPM | HEIGHT: 63 IN | SYSTOLIC BLOOD PRESSURE: 125 MMHG | TEMPERATURE: 97.5 F | BODY MASS INDEX: 31.82 KG/M2 | WEIGHT: 179.6 LBS

## 2023-07-10 DIAGNOSIS — K21.9 GERD: ICD-10-CM

## 2023-07-10 DIAGNOSIS — K63.89 SMALL INTESTINAL BACTERIAL OVERGROWTH (SIBO): ICD-10-CM

## 2023-07-10 DIAGNOSIS — Z86.010 PERSONAL HISTORY OF COLONIC POLYPS: ICD-10-CM

## 2023-07-10 DIAGNOSIS — K59.01 CONSTIPATION: ICD-10-CM

## 2023-07-10 PROCEDURE — 99213 OFFICE O/P EST LOW 20 MIN: CPT | Performed by: INTERNAL MEDICINE

## 2023-07-10 RX ORDER — SPIRONOLACTONE 50 MG/1
1 TABLET TABLET, FILM COATED ORAL ONCE A DAY
Status: ACTIVE | COMMUNITY

## 2023-07-10 RX ORDER — NIRAPARIB 100 MG/1
2 CAPSULES CAPSULE ORAL ONCE A DAY
Status: DISCONTINUED | COMMUNITY

## 2023-07-10 RX ORDER — LORAZEPAM 0.5 MG/1
1 TABLET AT BEDTIME AS NEEDED TABLET ORAL ONCE A DAY
Status: ACTIVE | COMMUNITY

## 2023-07-10 RX ORDER — PANTOPRAZOLE SODIUM 20 MG/1
TAKE 1 TABLET BY MOUTH TWICE  DAILY TABLET, DELAYED RELEASE ORAL
Qty: 180 TABLET | Refills: 3 | Status: ACTIVE | COMMUNITY

## 2023-07-10 RX ORDER — LEVOTHYROXINE SODIUM 100 UG/1
1 TABLET IN THE MORNING ON AN EMPTY STOMACH TABLET ORAL ONCE A DAY
Status: ACTIVE | COMMUNITY

## 2023-07-10 RX ORDER — LINACLOTIDE 290 UG/1
1 CAPSULE AT LEAST 30 MINUTES BEFORE THE FIRST MEAL OF THE DAY ON AN EMPTY STOMACH CAPSULE, GELATIN COATED ORAL ONCE A DAY
Qty: 90 | Refills: 1 | Status: DISCONTINUED | COMMUNITY

## 2023-07-10 RX ORDER — BUPROPION HYDROCHLORIDE 150 MG/1
1 TABLET IN THE MORNING TABLET, FILM COATED ORAL ONCE A DAY
Status: ACTIVE | COMMUNITY

## 2023-07-10 RX ORDER — NEBIVOLOL 5 MG/1
1 TABLET TABLET ORAL ONCE A DAY
Status: ACTIVE | COMMUNITY

## 2023-07-10 RX ORDER — ATORVASTATIN CALCIUM 20 MG/1
1 TABLET TABLET, FILM COATED ORAL ONCE A DAY
Status: ACTIVE | COMMUNITY

## 2023-07-10 NOTE — HPI-TODAY'S VISIT:
7/10/2023 -she is now seeing nephrologist due to proteinura -was on avastin for hser tumor but is switching to another medication  due to bp and proteinura -states that only dulcolax works for her constipation at this time  -also complains of burping  and gurgling discomfort  -she denies any caffeine /carbonated beverages .  specifically the burping happens after she eats -shestarts with one , sometimes will take a second .  if she takes two has some loose stool -has intermittent nausea as well, feels this goes along with anxiety -has been workig on intentionally losing weight- cut out a lot of carbs/sweets

## 2023-09-07 ENCOUNTER — OFFICE VISIT (OUTPATIENT)
Dept: URBAN - METROPOLITAN AREA CLINIC 23 | Facility: CLINIC | Age: 73
End: 2023-09-07
Payer: COMMERCIAL

## 2023-09-07 VITALS
TEMPERATURE: 97.7 F | HEART RATE: 63 BPM | BODY MASS INDEX: 32.21 KG/M2 | WEIGHT: 181.8 LBS | SYSTOLIC BLOOD PRESSURE: 138 MMHG | HEIGHT: 63 IN | DIASTOLIC BLOOD PRESSURE: 66 MMHG

## 2023-09-07 DIAGNOSIS — R14.0 BLOATING: ICD-10-CM

## 2023-09-07 DIAGNOSIS — K63.89 SMALL INTESTINAL BACTERIAL OVERGROWTH (SIBO): ICD-10-CM

## 2023-09-07 DIAGNOSIS — K21.9 GERD: ICD-10-CM

## 2023-09-07 DIAGNOSIS — K59.01 CONSTIPATION: ICD-10-CM

## 2023-09-07 PROCEDURE — 99214 OFFICE O/P EST MOD 30 MIN: CPT | Performed by: INTERNAL MEDICINE

## 2023-09-07 RX ORDER — ATORVASTATIN CALCIUM 20 MG/1
1 TABLET TABLET, FILM COATED ORAL ONCE A DAY
Status: ON HOLD | COMMUNITY

## 2023-09-07 RX ORDER — BUPROPION HYDROCHLORIDE 150 MG/1
1 TABLET IN THE MORNING TABLET, FILM COATED ORAL ONCE A DAY
Status: ACTIVE | COMMUNITY

## 2023-09-07 RX ORDER — SPIRONOLACTONE 50 MG/1
1 TABLET TABLET, FILM COATED ORAL ONCE A DAY
Status: ACTIVE | COMMUNITY

## 2023-09-07 RX ORDER — LEVOTHYROXINE SODIUM 100 UG/1
1 TABLET IN THE MORNING ON AN EMPTY STOMACH TABLET ORAL ONCE A DAY
Status: ACTIVE | COMMUNITY

## 2023-09-07 RX ORDER — LORAZEPAM 0.5 MG/1
1 TABLET AT BEDTIME AS NEEDED TABLET ORAL ONCE A DAY
Status: ACTIVE | COMMUNITY

## 2023-09-07 RX ORDER — ATORVASTATIN CALCIUM 40 MG/1
1 TABLET TABLET, FILM COATED ORAL ONCE A DAY
Status: ACTIVE | COMMUNITY

## 2023-09-07 RX ORDER — PANTOPRAZOLE SODIUM 20 MG/1
TAKE 1 TABLET BY MOUTH TWICE  DAILY TABLET, DELAYED RELEASE ORAL
Qty: 180 TABLET | Refills: 3 | Status: ACTIVE | COMMUNITY

## 2023-09-07 RX ORDER — NEBIVOLOL 5 MG/1
1 TABLET TABLET ORAL ONCE A DAY
Status: ACTIVE | COMMUNITY

## 2023-09-07 NOTE — HPI-CONSTIPATION
she is doing dulcolax every other day, the emptying is better.  still a little unpredictable -sometimes she has to take one after another -followed up with oncologist and asked to try miralax in the morning and the evening- she tried this and states that this worked on and off -she has switched to a new chemo- eleahere and just underwent her chemo treatment -she is continuing to follow with the nephrologist and had a kidney biopsy, has mild damage, bp under better control -she isn't having as much nausea -the burping and gurgling symptoms has improved  PREVIOUSLY     *amitiza- if she takes this at night she is having small frequent bm, has to go every hour , but doesn't feel like she is emptying out        *colace if she tries this she feels it works too well *linzess - not effective even at the highest dose and giving her pain

## 2023-09-19 ENCOUNTER — WEB ENCOUNTER (OUTPATIENT)
Dept: URBAN - METROPOLITAN AREA CLINIC 23 | Facility: CLINIC | Age: 73
End: 2023-09-19

## 2023-09-19 RX ORDER — RIFAXIMIN 550 MG/1
1 TABLET TABLET ORAL THREE TIMES A DAY
Qty: 42 TABLET | Refills: 0 | OUTPATIENT
Start: 2023-09-20 | End: 2023-10-04

## 2023-09-29 ENCOUNTER — WEB ENCOUNTER (OUTPATIENT)
Dept: URBAN - METROPOLITAN AREA CLINIC 23 | Facility: CLINIC | Age: 73
End: 2023-09-29

## 2023-09-30 ENCOUNTER — P2P PATIENT RECORD (OUTPATIENT)
Age: 73
End: 2023-09-30

## 2023-10-02 ENCOUNTER — WEB ENCOUNTER (OUTPATIENT)
Dept: URBAN - METROPOLITAN AREA CLINIC 23 | Facility: CLINIC | Age: 73
End: 2023-10-02

## 2023-10-02 RX ORDER — RIFAXIMIN 550 MG/1
1 TABLET TABLET ORAL THREE TIMES A DAY
Qty: 42 TABLET | Refills: 0 | OUTPATIENT
Start: 2023-09-20

## 2023-10-06 ENCOUNTER — TELEPHONE ENCOUNTER (OUTPATIENT)
Dept: URBAN - METROPOLITAN AREA CLINIC 12 | Facility: CLINIC | Age: 73
End: 2023-10-06

## 2023-10-25 ENCOUNTER — OFFICE VISIT (OUTPATIENT)
Dept: URBAN - METROPOLITAN AREA CLINIC 23 | Facility: CLINIC | Age: 73
End: 2023-10-25
Payer: COMMERCIAL

## 2023-10-25 ENCOUNTER — LAB OUTSIDE AN ENCOUNTER (OUTPATIENT)
Dept: URBAN - METROPOLITAN AREA CLINIC 23 | Facility: CLINIC | Age: 73
End: 2023-10-25

## 2023-10-25 VITALS
HEART RATE: 59 BPM | WEIGHT: 174.6 LBS | SYSTOLIC BLOOD PRESSURE: 123 MMHG | HEIGHT: 63 IN | DIASTOLIC BLOOD PRESSURE: 65 MMHG | BODY MASS INDEX: 30.94 KG/M2 | TEMPERATURE: 97.9 F

## 2023-10-25 DIAGNOSIS — R14.0 BLOATING: ICD-10-CM

## 2023-10-25 DIAGNOSIS — K21.9 GERD: ICD-10-CM

## 2023-10-25 DIAGNOSIS — R14.3 GAS: ICD-10-CM

## 2023-10-25 DIAGNOSIS — K59.01 CONSTIPATION: ICD-10-CM

## 2023-10-25 PROCEDURE — 99213 OFFICE O/P EST LOW 20 MIN: CPT | Performed by: INTERNAL MEDICINE

## 2023-10-25 RX ORDER — RIFAXIMIN 550 MG/1
1 TABLET TABLET ORAL THREE TIMES A DAY
Qty: 42 TABLET | Refills: 0 | OUTPATIENT
Start: 2023-10-25 | End: 2023-11-07

## 2023-10-25 RX ORDER — ATORVASTATIN CALCIUM 20 MG/1
1 TABLET TABLET, FILM COATED ORAL ONCE A DAY
Status: ON HOLD | COMMUNITY

## 2023-10-25 RX ORDER — LEVOTHYROXINE SODIUM 100 UG/1
1 TABLET IN THE MORNING ON AN EMPTY STOMACH TABLET ORAL ONCE A DAY
Status: ACTIVE | COMMUNITY

## 2023-10-25 RX ORDER — SPIRONOLACTONE 50 MG/1
1 TABLET TABLET, FILM COATED ORAL ONCE A DAY
Status: ACTIVE | COMMUNITY

## 2023-10-25 RX ORDER — RIFAXIMIN 550 MG/1
1 TABLET TABLET ORAL THREE TIMES A DAY
Qty: 42 TABLET | Refills: 0 | Status: ACTIVE | COMMUNITY
Start: 2023-09-20

## 2023-10-25 RX ORDER — NEBIVOLOL 5 MG/1
1 TABLET TABLET ORAL ONCE A DAY
Status: ACTIVE | COMMUNITY

## 2023-10-25 RX ORDER — ATORVASTATIN CALCIUM 40 MG/1
1 TABLET TABLET, FILM COATED ORAL ONCE A DAY
Status: ACTIVE | COMMUNITY

## 2023-10-25 RX ORDER — PANTOPRAZOLE SODIUM 20 MG/1
TAKE 1 TABLET BY MOUTH TWICE  DAILY TABLET, DELAYED RELEASE ORAL
Qty: 180 TABLET | Refills: 3 | Status: ACTIVE | COMMUNITY

## 2023-10-25 RX ORDER — BUPROPION HYDROCHLORIDE 150 MG/1
1 TABLET IN THE MORNING TABLET, FILM COATED ORAL ONCE A DAY
Status: ACTIVE | COMMUNITY

## 2023-10-25 RX ORDER — LORAZEPAM 0.5 MG/1
1 TABLET AT BEDTIME AS NEEDED TABLET ORAL ONCE A DAY
Status: ACTIVE | COMMUNITY

## 2023-10-25 NOTE — HPI-CONSTIPATION
-she has been having more bloating and gas since starting new treatment -advised change in diet The patient reports experiencing constipation issues after switching to a new treatment. They contacted the provider due to excessive gas and foul-smelling odor after the third treatment. The patient has been experiencing diarrhea, which is new for them. They had diarrhea on Friday night and took Pepto-Bismol and Imodium to stop it. They resumed their regular diet but experienced diarrhea again. The patient tried the BRAT diet but experienced stomach pain and gas. They did not have diarrhea the following night but had a soft bowel movement the next day.The patient has been taking Gas-X regularly but finds it does not help as much as they would like. They report that their gas is worse at night. They also experience a growling, loud sound in their stomach that has become embarrassing. The patient mentions that the medication they are on has changed their whole digestive system and has a lot of side effects, including diarrhea and constipation. They sometimes get a fever with the medication, and their doctor is unsure of the cause. The patient is concerned about having to go off the medication if they cannot control the diarrhea. The patient has not had any stool samples checked for the diarrhea to check for infection. They are on an immunosuppressing regimen and have been advised to do some stool tests to make sure it is not an infection like C. difficile. The patient's doctor mentioned that if Imodium does not work, there are stronger medications available, but they have different effects.   PREVIOUSLY     *amitiza- if she takes this at night she is having small frequent bm, has to go every hour , but doesn't feel like she is emptying out        *colace if she tries this she feels it works too well *linzess - not effective even at the highest dose and giving her pain

## 2023-11-01 ENCOUNTER — WEB ENCOUNTER (OUTPATIENT)
Dept: URBAN - METROPOLITAN AREA CLINIC 23 | Facility: CLINIC | Age: 73
End: 2023-11-01

## 2023-11-03 ENCOUNTER — WEB ENCOUNTER (OUTPATIENT)
Dept: URBAN - METROPOLITAN AREA CLINIC 23 | Facility: CLINIC | Age: 73
End: 2023-11-03

## 2023-11-04 LAB
C DIFFICILE TOXIN GENE NAA: NEGATIVE
C DIFFICILE TOXINS A+B, EIA: NEGATIVE
C DIFFICILE, CYTOTOXIN B: (no result)
Lab: (no result)

## 2023-11-08 ENCOUNTER — WEB ENCOUNTER (OUTPATIENT)
Dept: URBAN - METROPOLITAN AREA CLINIC 23 | Facility: CLINIC | Age: 73
End: 2023-11-08

## 2023-12-28 ENCOUNTER — CLAIMS CREATED FROM THE CLAIM WINDOW (OUTPATIENT)
Dept: URBAN - METROPOLITAN AREA CLINIC 23 | Facility: CLINIC | Age: 73
End: 2023-12-28
Payer: COMMERCIAL

## 2023-12-28 VITALS — TEMPERATURE: 97.3 F | BODY MASS INDEX: 30.83 KG/M2 | HEIGHT: 63 IN | WEIGHT: 174 LBS

## 2023-12-28 DIAGNOSIS — R14.0 BLOATING: ICD-10-CM

## 2023-12-28 DIAGNOSIS — R19.7 DIARRHEA, UNSPECIFIED TYPE: ICD-10-CM

## 2023-12-28 DIAGNOSIS — R10.9 ABDOMINAL CRAMPING: ICD-10-CM

## 2023-12-28 DIAGNOSIS — Z86.010 PERSONAL HISTORY OF COLONIC POLYPS: ICD-10-CM

## 2023-12-28 DIAGNOSIS — K63.89 SMALL INTESTINAL BACTERIAL OVERGROWTH (SIBO): ICD-10-CM

## 2023-12-28 DIAGNOSIS — R14.3 GAS: ICD-10-CM

## 2023-12-28 DIAGNOSIS — R11.0 NAUSEA: ICD-10-CM

## 2023-12-28 DIAGNOSIS — K58.9 IRRITABLE BOWEL SYNDROME: ICD-10-CM

## 2023-12-28 DIAGNOSIS — K21.9 GERD: ICD-10-CM

## 2023-12-28 DIAGNOSIS — K59.01 CONSTIPATION: ICD-10-CM

## 2023-12-28 DIAGNOSIS — C56.9 MALIGNANT NEOPLASM OF OVARY, UNSPECIFIED LATERALITY: ICD-10-CM

## 2023-12-28 PROCEDURE — 99214 OFFICE O/P EST MOD 30 MIN: CPT | Performed by: INTERNAL MEDICINE

## 2023-12-28 RX ORDER — BUPROPION HYDROCHLORIDE 150 MG/1
1 TABLET IN THE MORNING TABLET, FILM COATED ORAL ONCE A DAY
Status: ACTIVE | COMMUNITY

## 2023-12-28 RX ORDER — ATORVASTATIN CALCIUM 20 MG/1
1 TABLET TABLET, FILM COATED ORAL ONCE A DAY
Status: ON HOLD | COMMUNITY

## 2023-12-28 RX ORDER — ATORVASTATIN CALCIUM 40 MG/1
1 TABLET TABLET, FILM COATED ORAL ONCE A DAY
Status: ACTIVE | COMMUNITY

## 2023-12-28 RX ORDER — ALLOPURINOL 100 MG/1
1 TABLET TABLET ORAL ONCE A DAY
Status: ACTIVE | COMMUNITY

## 2023-12-28 RX ORDER — SPIRONOLACTONE 50 MG/1
1 TABLET TABLET, FILM COATED ORAL ONCE A DAY
Status: ACTIVE | COMMUNITY

## 2023-12-28 RX ORDER — NEBIVOLOL 5 MG/1
1 TABLET TABLET ORAL ONCE A DAY
Status: ACTIVE | COMMUNITY

## 2023-12-28 RX ORDER — PANTOPRAZOLE SODIUM 20 MG/1
TAKE 1 TABLET BY MOUTH TWICE  DAILY TABLET, DELAYED RELEASE ORAL
Qty: 180 TABLET | Refills: 3 | Status: ACTIVE | COMMUNITY

## 2023-12-28 RX ORDER — LORAZEPAM 0.5 MG/1
1 TABLET AT BEDTIME AS NEEDED TABLET ORAL ONCE A DAY
Status: ACTIVE | COMMUNITY

## 2023-12-28 RX ORDER — LEVOTHYROXINE SODIUM 100 UG/1
1 TABLET IN THE MORNING ON AN EMPTY STOMACH TABLET ORAL ONCE A DAY
Status: ACTIVE | COMMUNITY

## 2023-12-28 RX ORDER — RIFAXIMIN 550 MG/1
1 TABLET TABLET ORAL THREE TIMES A DAY
Qty: 42 TABLET | Refills: 0 | Status: ON HOLD | COMMUNITY
Start: 2023-09-20

## 2023-12-28 NOTE — HPI-CONSTIPATION
The patient reports that their oncologist decreased the dose of their chemotherapy treatment, which led to a reduction in diarrhea. However, they still experience diarrhea off and on for about a week following treatment, requiring the use of Imodium to manage it. After this week, the patient experiences constipation and takes Dulcolax every three to four days for two weeks until their next treatment. The patient typically takes three Imodium during the week of diarrhea, with one instance of needing four. They also have a prescription for Lomotil but did not need to use it after the last infusion.On a day-to-day basis, the patient experiences constipation outside of the week they receive treatment. They manage this by taking one Dulcolax, which effectively clears them out the next day. The patient is unsure if they should take Dulcolax every day and is concerned about potential overuse. The patient has tried the BRAT diet but has since returned to a more regular diet. They report that their insurance company initially denied a medication, then approved it, but they were denied financial aid for the $700 cost. The patient has not taken the medication yet and may not need to if their symptoms settle down. Nausea has increased recently, and the patient has been prescribed Dronabinol to manage it. They are advised to take it at night of chemo for five days, as nausea typically occurs within five days after the infusion.

## 2024-02-15 ENCOUNTER — OV EP (OUTPATIENT)
Dept: URBAN - METROPOLITAN AREA CLINIC 23 | Facility: CLINIC | Age: 74
End: 2024-02-15

## 2024-03-28 ENCOUNTER — OV EP (OUTPATIENT)
Dept: URBAN - METROPOLITAN AREA CLINIC 23 | Facility: CLINIC | Age: 74
End: 2024-03-28

## 2024-05-20 ENCOUNTER — DASHBOARD ENCOUNTERS (OUTPATIENT)
Age: 74
End: 2024-05-20

## 2024-05-20 ENCOUNTER — OFFICE VISIT (OUTPATIENT)
Dept: URBAN - METROPOLITAN AREA CLINIC 23 | Facility: CLINIC | Age: 74
End: 2024-05-20
Payer: COMMERCIAL

## 2024-05-20 VITALS
BODY MASS INDEX: 32.25 KG/M2 | HEART RATE: 66 BPM | WEIGHT: 182 LBS | DIASTOLIC BLOOD PRESSURE: 65 MMHG | TEMPERATURE: 97.9 F | SYSTOLIC BLOOD PRESSURE: 118 MMHG | HEIGHT: 63 IN

## 2024-05-20 DIAGNOSIS — R14.3 GAS: ICD-10-CM

## 2024-05-20 DIAGNOSIS — K58.1 IBS: ICD-10-CM

## 2024-05-20 DIAGNOSIS — K59.01 CONSTIPATION: ICD-10-CM

## 2024-05-20 DIAGNOSIS — K21.9 GERD: ICD-10-CM

## 2024-05-20 PROCEDURE — 99214 OFFICE O/P EST MOD 30 MIN: CPT | Performed by: INTERNAL MEDICINE

## 2024-05-20 RX ORDER — NEBIVOLOL 5 MG/1
1 TABLET TABLET ORAL ONCE A DAY
Status: ACTIVE | COMMUNITY

## 2024-05-20 RX ORDER — ATORVASTATIN CALCIUM 20 MG/1
1 TABLET TABLET, FILM COATED ORAL ONCE A DAY
Status: ON HOLD | COMMUNITY

## 2024-05-20 RX ORDER — SPIRONOLACTONE 50 MG/1
1 TABLET TABLET, FILM COATED ORAL ONCE A DAY
Status: ACTIVE | COMMUNITY

## 2024-05-20 RX ORDER — RIFAXIMIN 550 MG/1
1 TABLET TABLET ORAL THREE TIMES A DAY
Qty: 42 TABLET | Refills: 0 | Status: ACTIVE | COMMUNITY
Start: 2023-09-20

## 2024-05-20 RX ORDER — ALLOPURINOL 100 MG/1
1 TABLET TABLET ORAL ONCE A DAY
Status: ACTIVE | COMMUNITY

## 2024-05-20 RX ORDER — PANTOPRAZOLE SODIUM 20 MG/1
TAKE 1 TABLET BY MOUTH TWICE  DAILY TABLET, DELAYED RELEASE ORAL
Qty: 180 TABLET | Refills: 3 | Status: ACTIVE | COMMUNITY

## 2024-05-20 RX ORDER — LORAZEPAM 0.5 MG/1
1 TABLET AT BEDTIME AS NEEDED TABLET ORAL ONCE A DAY
Status: ACTIVE | COMMUNITY

## 2024-05-20 RX ORDER — BUPROPION HYDROCHLORIDE 150 MG/1
1 TABLET IN THE MORNING TABLET, FILM COATED ORAL ONCE A DAY
Status: ACTIVE | COMMUNITY

## 2024-05-20 RX ORDER — PANTOPRAZOLE SODIUM 20 MG/1
TAKE 1 TABLET BY MOUTH TWICE  DAILY TABLET, DELAYED RELEASE ORAL
OUTPATIENT

## 2024-05-20 RX ORDER — ESOMEPRAZOLE MAGNESIUM 20 MG/1
1 CAPSULE CAPSULE, DELAYED RELEASE ORAL TWICE A DAY
Qty: 180 | Refills: 3 | OUTPATIENT
Start: 2024-05-20

## 2024-05-20 RX ORDER — ATORVASTATIN CALCIUM 40 MG/1
1 TABLET TABLET, FILM COATED ORAL ONCE A DAY
Status: ACTIVE | COMMUNITY

## 2024-05-20 RX ORDER — LEVOTHYROXINE SODIUM 100 UG/1
1 TABLET IN THE MORNING ON AN EMPTY STOMACH TABLET ORAL ONCE A DAY
Status: ACTIVE | COMMUNITY

## 2024-05-21 ENCOUNTER — TELEPHONE ENCOUNTER (OUTPATIENT)
Dept: URBAN - METROPOLITAN AREA CLINIC 6 | Facility: CLINIC | Age: 74
End: 2024-05-21

## 2024-08-19 ENCOUNTER — OFFICE VISIT (OUTPATIENT)
Dept: URBAN - METROPOLITAN AREA CLINIC 23 | Facility: CLINIC | Age: 74
End: 2024-08-19
Payer: COMMERCIAL

## 2024-08-19 VITALS
TEMPERATURE: 97.3 F | SYSTOLIC BLOOD PRESSURE: 140 MMHG | HEIGHT: 63 IN | BODY MASS INDEX: 34.55 KG/M2 | WEIGHT: 195 LBS | DIASTOLIC BLOOD PRESSURE: 78 MMHG | HEART RATE: 56 BPM

## 2024-08-19 DIAGNOSIS — R14.3 GAS: ICD-10-CM

## 2024-08-19 DIAGNOSIS — K59.01 CONSTIPATION: ICD-10-CM

## 2024-08-19 DIAGNOSIS — K21.9 GERD: ICD-10-CM

## 2024-08-19 DIAGNOSIS — K63.89 SMALL INTESTINAL BACTERIAL OVERGROWTH (SIBO): ICD-10-CM

## 2024-08-19 PROCEDURE — 99214 OFFICE O/P EST MOD 30 MIN: CPT | Performed by: INTERNAL MEDICINE

## 2024-08-19 RX ORDER — SPIRONOLACTONE 50 MG/1
1 TABLET TABLET, FILM COATED ORAL ONCE A DAY
Status: ACTIVE | COMMUNITY

## 2024-08-19 RX ORDER — ATORVASTATIN CALCIUM 20 MG/1
1 TABLET TABLET, FILM COATED ORAL ONCE A DAY
Status: ON HOLD | COMMUNITY

## 2024-08-19 RX ORDER — RIFAXIMIN 550 MG/1
1 TABLET TABLET ORAL THREE TIMES A DAY
Qty: 42 TABLET | Refills: 0 | Status: ACTIVE | COMMUNITY
Start: 2023-09-20

## 2024-08-19 RX ORDER — BUPROPION HYDROCHLORIDE 150 MG/1
1 TABLET IN THE MORNING TABLET, FILM COATED ORAL ONCE A DAY
Status: ACTIVE | COMMUNITY

## 2024-08-19 RX ORDER — ESOMEPRAZOLE MAGNESIUM 20 MG/1
1 CAPSULE CAPSULE, DELAYED RELEASE ORAL TWICE A DAY
Qty: 180 | Refills: 3 | Status: ACTIVE | COMMUNITY
Start: 2024-05-20

## 2024-08-19 RX ORDER — ESOMEPRAZOLE MAGNESIUM 20 MG/1
1 CAPSULE CAPSULE, DELAYED RELEASE ORAL TWICE A DAY
Qty: 180 | Refills: 3 | OUTPATIENT

## 2024-08-19 RX ORDER — LEVOTHYROXINE SODIUM 100 UG/1
1 TABLET IN THE MORNING ON AN EMPTY STOMACH TABLET ORAL ONCE A DAY
Status: ACTIVE | COMMUNITY

## 2024-08-19 RX ORDER — NEBIVOLOL 5 MG/1
1 TABLET TABLET ORAL ONCE A DAY
Status: ACTIVE | COMMUNITY

## 2024-08-19 RX ORDER — ATORVASTATIN CALCIUM 40 MG/1
1 TABLET TABLET, FILM COATED ORAL ONCE A DAY
Status: ACTIVE | COMMUNITY

## 2024-08-19 RX ORDER — ALLOPURINOL 100 MG/1
1 TABLET TABLET ORAL ONCE A DAY
Status: ACTIVE | COMMUNITY

## 2024-08-19 RX ORDER — LORAZEPAM 0.5 MG/1
1 TABLET AT BEDTIME AS NEEDED TABLET ORAL ONCE A DAY
Status: ACTIVE | COMMUNITY

## 2024-08-19 NOTE — HPI-TODAY'S VISIT:
- Presents with side effects from chemotherapy treatment for ovarian cancer, including fever, chills, and changes in bowel habits (constipation alternating with diarrhea, particularly after the second and third treatments) - Currently using Dulcolax as needed for constipation, which requires planning around daily activities   - Reports MiraLAX was working initially but has since stopped being effective, possibly due to the [place] chemotherapy regimen - Chemotherapy schedule: 2 weeks on, 1 week off - Experiences loud bowel sounds and gurgling after eating, regardless of the type of food consumed, without associated pain, nausea, or vomiting - Recently switched from pantoprazole to Nexium for management of indigestion symptoms (5 days ago) - Developed an itchy rash, initially thought to be related to Doxil chemotherapy or Nexium, but ultimately determined to be an allergic reaction to Lipitor, which has been discontinued

## 2024-09-25 NOTE — PHYSICAL EXAM CHEST:
chest wall non-tender, breathing is unlabored without accessory muscle use, normal breath sounds
[FreeTextEntry2] : left knee pain

## 2025-02-20 ENCOUNTER — OFFICE VISIT (OUTPATIENT)
Dept: URBAN - METROPOLITAN AREA CLINIC 23 | Facility: CLINIC | Age: 75
End: 2025-02-20
Payer: COMMERCIAL

## 2025-02-20 VITALS
SYSTOLIC BLOOD PRESSURE: 113 MMHG | HEIGHT: 63 IN | BODY MASS INDEX: 34.48 KG/M2 | HEART RATE: 81 BPM | WEIGHT: 194.6 LBS | DIASTOLIC BLOOD PRESSURE: 71 MMHG

## 2025-02-20 DIAGNOSIS — K59.01 CONSTIPATION: ICD-10-CM

## 2025-02-20 DIAGNOSIS — K58.9 IRRITABLE BOWEL SYNDROME: ICD-10-CM

## 2025-02-20 PROCEDURE — 99214 OFFICE O/P EST MOD 30 MIN: CPT

## 2025-02-20 RX ORDER — ALLOPURINOL 100 MG/1
1 TABLET TABLET ORAL ONCE A DAY
Status: ACTIVE | COMMUNITY

## 2025-02-20 RX ORDER — BUPROPION HYDROCHLORIDE 150 MG/1
1 TABLET IN THE MORNING TABLET, FILM COATED ORAL ONCE A DAY
Status: ACTIVE | COMMUNITY

## 2025-02-20 RX ORDER — NEBIVOLOL 5 MG/1
1 TABLET TABLET ORAL ONCE A DAY
Status: ACTIVE | COMMUNITY

## 2025-02-20 RX ORDER — LEVOTHYROXINE SODIUM 100 UG/1
1 TABLET IN THE MORNING ON AN EMPTY STOMACH TABLET ORAL ONCE A DAY
Status: ACTIVE | COMMUNITY

## 2025-02-20 RX ORDER — RIFAXIMIN 550 MG/1
1 TABLET TABLET ORAL THREE TIMES A DAY
Qty: 42 TABLET | Refills: 0 | Status: ACTIVE | COMMUNITY
Start: 2023-09-20

## 2025-02-20 RX ORDER — LORAZEPAM 0.5 MG/1
1 TABLET AT BEDTIME AS NEEDED TABLET ORAL ONCE A DAY
Status: ACTIVE | COMMUNITY

## 2025-02-20 RX ORDER — SPIRONOLACTONE 50 MG/1
1 TABLET TABLET, FILM COATED ORAL ONCE A DAY
Status: ACTIVE | COMMUNITY

## 2025-02-20 RX ORDER — ATORVASTATIN CALCIUM 40 MG/1
1 TABLET TABLET, FILM COATED ORAL ONCE A DAY
Status: ACTIVE | COMMUNITY

## 2025-02-20 RX ORDER — ESOMEPRAZOLE MAGNESIUM 20 MG/1
1 CAPSULE CAPSULE, DELAYED RELEASE ORAL TWICE A DAY
Qty: 180 | Refills: 3 | Status: ACTIVE | COMMUNITY

## 2025-02-20 RX ORDER — ATORVASTATIN CALCIUM 20 MG/1
1 TABLET TABLET, FILM COATED ORAL ONCE A DAY
Status: ON HOLD | COMMUNITY

## 2025-02-20 NOTE — HPI-TODAY'S VISIT:
74-year-old female here for 6-month follow-up.  She was last seen in clinic August 2024 by Dr. Solano for SIBO, gas, constipation, GERD, abdominal cramping and nausea. She is following with Dr. South for chemotherapy in regards to ovarian cancer. Recommend continue with Nexium 20 mg twice daily.  CT negative.   She complains of continued constipation. She takes 1 dulcolax every 3 days then gets diarrhea. She tried miralax in the past, 1 capful with no relief. Not on fiber supplement or stool softner. No abd pain or bleeding.   -Colon due 2026

## 2025-05-20 ENCOUNTER — TELEPHONE ENCOUNTER (OUTPATIENT)
Dept: URBAN - METROPOLITAN AREA CLINIC 23 | Facility: CLINIC | Age: 75
End: 2025-05-20

## 2025-05-20 RX ORDER — ESOMEPRAZOLE MAGNESIUM 20 MG/1
1 CAPSULE CAPSULE, DELAYED RELEASE ORAL TWICE A DAY
Qty: 180 | Refills: 3

## 2025-06-30 ENCOUNTER — WEB ENCOUNTER (OUTPATIENT)
Dept: URBAN - METROPOLITAN AREA CLINIC 23 | Facility: CLINIC | Age: 75
End: 2025-06-30

## 2025-07-01 ENCOUNTER — WEB ENCOUNTER (OUTPATIENT)
Dept: URBAN - METROPOLITAN AREA CLINIC 23 | Facility: CLINIC | Age: 75
End: 2025-07-01

## 2025-07-14 ENCOUNTER — TELEPHONE ENCOUNTER (OUTPATIENT)
Dept: URBAN - METROPOLITAN AREA CLINIC 23 | Facility: CLINIC | Age: 75
End: 2025-07-14